# Patient Record
Sex: MALE | Race: WHITE | NOT HISPANIC OR LATINO | Employment: FULL TIME | URBAN - METROPOLITAN AREA
[De-identification: names, ages, dates, MRNs, and addresses within clinical notes are randomized per-mention and may not be internally consistent; named-entity substitution may affect disease eponyms.]

---

## 2017-11-29 ENCOUNTER — OFFICE VISIT (OUTPATIENT)
Dept: FAMILY MEDICINE | Facility: CLINIC | Age: 22
End: 2017-11-29
Payer: COMMERCIAL

## 2017-11-29 VITALS
HEIGHT: 73 IN | TEMPERATURE: 96 F | DIASTOLIC BLOOD PRESSURE: 70 MMHG | HEART RATE: 80 BPM | OXYGEN SATURATION: 98 % | BODY MASS INDEX: 32.54 KG/M2 | WEIGHT: 245.56 LBS | SYSTOLIC BLOOD PRESSURE: 124 MMHG

## 2017-11-29 DIAGNOSIS — B34.9 VIRAL SYNDROME: Primary | ICD-10-CM

## 2017-11-29 LAB
CTP QC/QA: YES
CTP QC/QA: YES
FLUAV AG NPH QL: NEGATIVE
FLUBV AG NPH QL: NEGATIVE
S PYO RRNA THROAT QL PROBE: NEGATIVE

## 2017-11-29 PROCEDURE — 99999 PR PBB SHADOW E&M-EST. PATIENT-LVL III: CPT | Mod: PBBFAC,,, | Performed by: PHYSICIAN ASSISTANT

## 2017-11-29 PROCEDURE — 87880 STREP A ASSAY W/OPTIC: CPT | Mod: QW,S$GLB,, | Performed by: PHYSICIAN ASSISTANT

## 2017-11-29 PROCEDURE — 99213 OFFICE O/P EST LOW 20 MIN: CPT | Mod: S$GLB,,, | Performed by: PHYSICIAN ASSISTANT

## 2017-11-29 PROCEDURE — 87804 INFLUENZA ASSAY W/OPTIC: CPT | Mod: QW,S$GLB,, | Performed by: PHYSICIAN ASSISTANT

## 2017-11-29 RX ORDER — DESLORATADINE 5 MG/1
5 TABLET ORAL DAILY
Qty: 30 TABLET | Refills: 11 | Status: SHIPPED | OUTPATIENT
Start: 2017-11-29 | End: 2019-03-08 | Stop reason: SDUPTHER

## 2017-11-29 NOTE — PROGRESS NOTES
Subjective:       Patient ID: Matthew Schmidt is a 22 y.o. male with multiple medical diagnoses as listed in the medical history and problem list that presents for Sore Throat  .    Chief Complaint: Sore Throat      Sore Throat    This is a new problem. The current episode started yesterday. The problem has been gradually worsening. The pain is worse on the left side. The pain is at a severity of 9/10. Associated symptoms include congestion, coughing (at night ), headaches and trouble swallowing. Pertinent negatives include no abdominal pain, diarrhea, ear pain, shortness of breath or vomiting. Treatments tried: advil last time was 6 am      Review of Systems   Constitutional: Positive for chills, fatigue and fever (subjective ).   HENT: Positive for congestion, rhinorrhea, sneezing, sore throat and trouble swallowing. Negative for ear pain, sinus pain and sinus pressure.    Eyes: Positive for discharge and itching. Negative for pain and redness.   Respiratory: Positive for cough (at night ) and chest tightness. Negative for shortness of breath and wheezing.    Gastrointestinal: Negative for abdominal pain, constipation, diarrhea, nausea and vomiting.   Musculoskeletal: Positive for myalgias.   Neurological: Positive for headaches.         PAST MEDICAL HISTORY:  History reviewed. No pertinent past medical history.    SOCIAL HISTORY:  Social History     Social History    Marital status: Single     Spouse name: N/A    Number of children: N/A    Years of education: N/A     Occupational History    Not on file.     Social History Main Topics    Smoking status: Never Smoker    Smokeless tobacco: Never Used    Alcohol use Yes    Drug use: No    Sexual activity: Yes     Partners: Female     Other Topics Concern    Not on file     Social History Narrative    [employer]    Video games: PS4, PC-[employer]       ALLERGIES AND MEDICATIONS: updated and reviewed.  Review of patient's allergies  "indicates:  No Known Allergies  Current Outpatient Prescriptions   Medication Sig Dispense Refill    desloratadine (CLARINEX) 5 mg tablet Take 1 tablet (5 mg total) by mouth once daily. 30 tablet 11     No current facility-administered medications for this visit.          Objective:   /70   Pulse 80   Temp 96.4 °F (35.8 °C) (Oral)   Ht 6' 1" (1.854 m)   Wt 111.4 kg (245 lb 9.5 oz)   SpO2 98%   BMI 32.40 kg/m²      Physical Exam   Constitutional: He is oriented to person, place, and time. No distress.   HENT:   Head: Normocephalic and atraumatic.   Right Ear: External ear and ear canal normal. Tympanic membrane is injected.   Left Ear: External ear and ear canal normal. Tympanic membrane is injected.   Nose: Mucosal edema and rhinorrhea present. Right sinus exhibits no maxillary sinus tenderness and no frontal sinus tenderness. Left sinus exhibits no maxillary sinus tenderness and no frontal sinus tenderness.   Mouth/Throat: Uvula is midline and mucous membranes are normal. No oropharyngeal exudate or posterior oropharyngeal erythema.   Air fluid levels   PND   Eyes: Conjunctivae and EOM are normal.   Cardiovascular: Normal rate and regular rhythm.    Pulmonary/Chest: Effort normal and breath sounds normal. He has no wheezes.   Negative egophony    Musculoskeletal: Normal range of motion.   Lymphadenopathy:     He has no cervical adenopathy.   Neurological: He is alert and oriented to person, place, and time.   Skin: Skin is warm. No erythema.           Assessment:       1. Viral syndrome        Plan:       Viral syndrome  -     POCT Rapid Strep A: negative  -     POCT Influenza A/B: negatve  -     desloratadine (CLARINEX) 5 mg tablet; Take 1 tablet (5 mg total) by mouth once daily.  Dispense: 30 tablet; Refill: 11  Ibuprofen 400 mg every 6 hours with food                 No Follow-up on file.  "

## 2017-11-29 NOTE — LETTER
November 29, 2017                 [facility]  Family Medicine  [address]  [address]  [place] [address]  Phone: [phone]  Fax: [phone]   November 29, 2017     Patient: Matthew Schmidt   YOB: 1995   Date of Visit: 11/29/2017       To Whom it May Concern:    Matthew Schmidt was seen in my clinic on 11/29/2017. He may return to work on 12/1/17.    If you have any questions or concerns, please don't hesitate to call.    Sincerely,         BRINDA Zepeda

## 2018-03-01 ENCOUNTER — OFFICE VISIT (OUTPATIENT)
Dept: FAMILY MEDICINE | Facility: CLINIC | Age: 23
End: 2018-03-01
Payer: COMMERCIAL

## 2018-03-01 VITALS
HEART RATE: 80 BPM | SYSTOLIC BLOOD PRESSURE: 110 MMHG | TEMPERATURE: 98 F | HEIGHT: 73 IN | DIASTOLIC BLOOD PRESSURE: 60 MMHG | BODY MASS INDEX: 31.03 KG/M2 | WEIGHT: 234.13 LBS | OXYGEN SATURATION: 97 %

## 2018-03-01 DIAGNOSIS — Z13.220 LIPID SCREENING: ICD-10-CM

## 2018-03-01 DIAGNOSIS — Z00.00 HEALTH CARE MAINTENANCE: ICD-10-CM

## 2018-03-01 DIAGNOSIS — J20.9 ACUTE BRONCHITIS, UNSPECIFIED ORGANISM: Primary | ICD-10-CM

## 2018-03-01 PROCEDURE — 99999 PR PBB SHADOW E&M-EST. PATIENT-LVL III: CPT | Mod: PBBFAC,,, | Performed by: INTERNAL MEDICINE

## 2018-03-01 PROCEDURE — 99213 OFFICE O/P EST LOW 20 MIN: CPT | Mod: S$GLB,,, | Performed by: INTERNAL MEDICINE

## 2018-03-01 RX ORDER — FLUTICASONE PROPIONATE 50 MCG
SPRAY, SUSPENSION (ML) NASAL
Refills: 0 | COMMUNITY
Start: 2018-01-10 | End: 2019-03-08 | Stop reason: SDUPTHER

## 2018-03-01 RX ORDER — AZITHROMYCIN 250 MG/1
TABLET, FILM COATED ORAL
Refills: 0 | COMMUNITY
Start: 2018-01-10 | End: 2018-05-09 | Stop reason: ALTCHOICE

## 2018-03-01 RX ORDER — ALBUTEROL SULFATE 90 UG/1
AEROSOL, METERED RESPIRATORY (INHALATION)
COMMUNITY
Start: 2018-02-28 | End: 2023-07-28

## 2018-03-01 RX ORDER — BENZONATATE 100 MG/1
CAPSULE ORAL
COMMUNITY
Start: 2018-02-28 | End: 2018-05-09 | Stop reason: ALTCHOICE

## 2018-03-01 RX ORDER — AMOXICILLIN AND CLAVULANATE POTASSIUM 875; 125 MG/1; MG/1
1 TABLET, FILM COATED ORAL 2 TIMES DAILY
Refills: 0 | COMMUNITY
Start: 2018-02-28 | End: 2018-05-09 | Stop reason: ALTCHOICE

## 2018-03-01 RX ORDER — ALBUTEROL SULFATE 0.83 MG/ML
SOLUTION RESPIRATORY (INHALATION)
Refills: 0 | COMMUNITY
Start: 2018-02-28 | End: 2022-04-01

## 2018-03-01 RX ORDER — PROMETHAZINE HYDROCHLORIDE AND DEXTROMETHORPHAN HYDROBROMIDE 6.25; 15 MG/5ML; MG/5ML
SYRUP ORAL
COMMUNITY
Start: 2018-02-28 | End: 2018-05-09 | Stop reason: ALTCHOICE

## 2018-03-01 RX ORDER — AZELASTINE 1 MG/ML
SPRAY, METERED NASAL
Refills: 0 | COMMUNITY
Start: 2018-01-10 | End: 2019-03-08

## 2018-03-01 RX ORDER — METHYLPREDNISOLONE 4 MG/1
TABLET ORAL
Refills: 0 | COMMUNITY
Start: 2018-02-28 | End: 2018-05-09 | Stop reason: ALTCHOICE

## 2018-03-01 NOTE — PROGRESS NOTES
SUBJECTIVE     Chief Complaint   Patient presents with    Follow-up     Bronchitis.       HPI  Matthew Schmidt is a 22 y.o. male with multiple medical diagnoses as listed in the medical history and problem list that presents for follow-up after recent urgent care for bronchitis. Pt reports symptoms of cough/wheezing started Monday and he went to Urgent Care yesterday and was diagnosed with acute bronchitis. Pt received a breathing treatment while there along with a steroid shot. He was discharged an albuterol inhaler, nebulizer, Augmentin  872-125 BID x 10 days, and a Medrol Dose Pack. He feels better since starting therapy. He had a CXR concerning for small calcified lymph nodes in the R hilum, so he presented here today for follow up.    PAST MEDICAL HISTORY:  History reviewed. No pertinent past medical history.    PAST SURGICAL HISTORY:  Past Surgical History:   Procedure Laterality Date    ADENOIDECTOMY      TONSILLECTOMY         SOCIAL HISTORY:  Social History     Social History    Marital status: Single     Spouse name: N/A    Number of children: N/A    Years of education: N/A     Occupational History    Not on file.     Social History Main Topics    Smoking status: Never Smoker    Smokeless tobacco: Never Used    Alcohol use Yes    Drug use: No    Sexual activity: Yes     Partners: Female     Other Topics Concern    Not on file     Social History Narrative    [employer]    Video games: PS4, [employer]       FAMILY HISTORY:  History reviewed. No pertinent family history.    ALLERGIES AND MEDICATIONS: updated and reviewed.  Review of patient's allergies indicates:  No Known Allergies  Current Outpatient Prescriptions   Medication Sig Dispense Refill    albuterol (PROVENTIL) 2.5 mg /3 mL (0.083 %) nebulizer solution nebulize ONE ampule FOUR TIMES DAILY AS NEEDED  0    amoxicillin-clavulanate 875-125mg (AUGMENTIN) 875-125 mg per tablet Take 1 tablet by mouth 2 (two)  "times daily.  0    azelastine (ASTELIN) 137 mcg (0.1 %) nasal spray ONE PUFF EACH NOSTRIL TWICE DAILY  0    azithromycin (Z-JAREN) 250 MG tablet TAKE 2 TABLETS TODAY THEN 1 TABLET DAILY UNTIL FINISHED  0    benzonatate (TESSALON) 100 MG capsule       desloratadine (CLARINEX) 5 mg tablet Take 1 tablet (5 mg total) by mouth once daily. 30 tablet 11    fluticasone (FLONASE) 50 mcg/actuation nasal spray ONE PUFF EACH NOSTRIL TWICE DAILY  0    methylPREDNISolone (MEDROL DOSEPACK) 4 mg tablet TAKE AS DIRECTED (start 03/01/2018)  0    PROAIR HFA 90 mcg/actuation inhaler       promethazine-dextromethorphan (PROMETHAZINE-DM) 6.25-15 mg/5 mL Syrp        No current facility-administered medications for this visit.        ROS  Review of Systems   Constitutional: Negative for chills and fever.   HENT: Negative for hearing loss and sore throat.    Eyes: Negative for visual disturbance.   Respiratory: Positive for cough and wheezing. Negative for shortness of breath.    Cardiovascular: Negative for chest pain, palpitations and leg swelling.   Gastrointestinal: Negative for abdominal pain, constipation, diarrhea, nausea and vomiting.   Genitourinary: Negative for dysuria, frequency and urgency.   Musculoskeletal: Negative for arthralgias, joint swelling and myalgias.   Skin: Negative for rash and wound.   Neurological: Negative for headaches.   Psychiatric/Behavioral: Negative for agitation and confusion. The patient is not nervous/anxious.          OBJECTIVE     Physical Exam  Vitals:    03/01/18 0855   BP: 110/60   Pulse: 80   Temp: 97.8 °F (36.6 °C)    Body mass index is 30.89 kg/m².  Weight: 106.2 kg (234 lb 2.1 oz)   Height: 6' 1" (185.4 cm)     Physical Exam   Constitutional: He is oriented to person, place, and time. He appears well-developed and well-nourished. No distress.   HENT:   Head: Normocephalic and atraumatic.   Right Ear: External ear normal.   Left Ear: External ear normal.   Nose: Nose normal. "   Mouth/Throat: Oropharynx is clear and moist.   Eyes: Conjunctivae and EOM are normal. Right eye exhibits no discharge. Left eye exhibits no discharge. No scleral icterus.   Neck: Normal range of motion. Neck supple. No JVD present. No tracheal deviation present.   Cardiovascular: Normal rate, regular rhythm, normal heart sounds and intact distal pulses.  Exam reveals no gallop and no friction rub.    No murmur heard.  Pulmonary/Chest: Effort normal and breath sounds normal. No respiratory distress. He has no wheezes.   Abdominal: Soft. Bowel sounds are normal. He exhibits no distension and no mass. There is no tenderness. There is no rebound and no guarding.   Musculoskeletal: Normal range of motion. He exhibits no edema, tenderness or deformity.   Neurological: He is alert and oriented to person, place, and time. He exhibits normal muscle tone. Coordination normal.   Skin: Skin is warm and dry. No rash noted. No erythema.   Psychiatric: He has a normal mood and affect. His behavior is normal. Judgment and thought content normal.         Health Maintenance       Date Due Completion Date    Lipid Panel 1995 ---    TETANUS VACCINE 05/08/2020 (Originally 9/21/2013) ---            ASSESSMENT     22 y.o. male with     1. Acute bronchitis, unspecified organism    2. Lipid screening    3. Health care maintenance        PLAN:     1. Acute bronchitis, unspecified organism  - Continue symptomatic treatment with rest, increase fluid intake, tylenol or ibuprofen PRN fever(temp >/= 100.4) or body aches. Okay to take OTC antihistamines, i.e. Bendaryl, Claritin, Allegra, etc. as needed.  - Okay to gargle with warm, salt water or use throat lozenges as needed  - Pt advised to continue meds as previously prescribed  - X-Ray Chest PA And Lateral; Future    2. Lipid screening  - Lipid panel; Future    3. Health care maintenance  - CBC auto differential; Future  - Comprehensive metabolic panel; Future  - TSH; Future        RTC  in 1-2 weeks as needed for any acute worsening of current condition or failure to improve     Lucinda Gauthier MD  03/01/2018 9:01 AM        No Follow-up on file.

## 2018-03-14 ENCOUNTER — LAB VISIT (OUTPATIENT)
Dept: LAB | Facility: HOSPITAL | Age: 23
End: 2018-03-14
Attending: FAMILY MEDICINE
Payer: COMMERCIAL

## 2018-03-14 DIAGNOSIS — Z13.220 LIPID SCREENING: ICD-10-CM

## 2018-03-14 DIAGNOSIS — D64.9 NORMOCYTIC ANEMIA: Primary | ICD-10-CM

## 2018-03-14 DIAGNOSIS — Z00.00 HEALTH CARE MAINTENANCE: ICD-10-CM

## 2018-03-14 LAB
ALBUMIN SERPL BCP-MCNC: 4.3 G/DL
ALP SERPL-CCNC: 63 U/L
ALT SERPL W/O P-5'-P-CCNC: 24 U/L
ANION GAP SERPL CALC-SCNC: 10 MMOL/L
AST SERPL-CCNC: 23 U/L
BASOPHILS # BLD AUTO: 0.03 K/UL
BASOPHILS NFR BLD: 0.6 %
BILIRUB SERPL-MCNC: 0.6 MG/DL
BUN SERPL-MCNC: 6 MG/DL
CALCIUM SERPL-MCNC: 9.7 MG/DL
CHLORIDE SERPL-SCNC: 108 MMOL/L
CHOLEST SERPL-MCNC: 141 MG/DL
CHOLEST/HDLC SERPL: 3.6 {RATIO}
CO2 SERPL-SCNC: 23 MMOL/L
CREAT SERPL-MCNC: 0.9 MG/DL
DIFFERENTIAL METHOD: ABNORMAL
EOSINOPHIL # BLD AUTO: 0.1 K/UL
EOSINOPHIL NFR BLD: 2.6 %
ERYTHROCYTE [DISTWIDTH] IN BLOOD BY AUTOMATED COUNT: 12.6 %
EST. GFR  (AFRICAN AMERICAN): >60 ML/MIN/1.73 M^2
EST. GFR  (NON AFRICAN AMERICAN): >60 ML/MIN/1.73 M^2
GLUCOSE SERPL-MCNC: 83 MG/DL
HCT VFR BLD AUTO: 39.9 %
HDLC SERPL-MCNC: 39 MG/DL
HDLC SERPL: 27.7 %
HGB BLD-MCNC: 13.8 G/DL
LDLC SERPL CALC-MCNC: 88.2 MG/DL
LYMPHOCYTES # BLD AUTO: 1.5 K/UL
LYMPHOCYTES NFR BLD: 27.9 %
MCH RBC QN AUTO: 30.5 PG
MCHC RBC AUTO-ENTMCNC: 34.6 G/DL
MCV RBC AUTO: 88 FL
MONOCYTES # BLD AUTO: 0.9 K/UL
MONOCYTES NFR BLD: 16.1 %
NEUTROPHILS # BLD AUTO: 2.8 K/UL
NEUTROPHILS NFR BLD: 52.6 %
NONHDLC SERPL-MCNC: 102 MG/DL
PLATELET # BLD AUTO: 240 K/UL
PMV BLD AUTO: 10.6 FL
POTASSIUM SERPL-SCNC: 3.6 MMOL/L
PROT SERPL-MCNC: 7.5 G/DL
RBC # BLD AUTO: 4.53 M/UL
SODIUM SERPL-SCNC: 141 MMOL/L
TRIGL SERPL-MCNC: 69 MG/DL
TSH SERPL DL<=0.005 MIU/L-ACNC: 0.8 UIU/ML
WBC # BLD AUTO: 5.34 K/UL

## 2018-03-14 PROCEDURE — 80053 COMPREHEN METABOLIC PANEL: CPT

## 2018-03-14 PROCEDURE — 36415 COLL VENOUS BLD VENIPUNCTURE: CPT | Mod: PO

## 2018-03-14 PROCEDURE — 84443 ASSAY THYROID STIM HORMONE: CPT

## 2018-03-14 PROCEDURE — 85025 COMPLETE CBC W/AUTO DIFF WBC: CPT

## 2018-03-14 PROCEDURE — 80061 LIPID PANEL: CPT

## 2018-05-09 ENCOUNTER — OFFICE VISIT (OUTPATIENT)
Dept: FAMILY MEDICINE | Facility: CLINIC | Age: 23
End: 2018-05-09
Payer: COMMERCIAL

## 2018-05-09 VITALS
TEMPERATURE: 98 F | WEIGHT: 227.06 LBS | BODY MASS INDEX: 30.09 KG/M2 | OXYGEN SATURATION: 99 % | HEIGHT: 73 IN | HEART RATE: 83 BPM | DIASTOLIC BLOOD PRESSURE: 70 MMHG | SYSTOLIC BLOOD PRESSURE: 124 MMHG

## 2018-05-09 DIAGNOSIS — D64.9 ANEMIA, UNSPECIFIED TYPE: ICD-10-CM

## 2018-05-09 DIAGNOSIS — H92.01 OTALGIA OF RIGHT EAR: Primary | ICD-10-CM

## 2018-05-09 PROCEDURE — 3008F BODY MASS INDEX DOCD: CPT | Mod: CPTII,S$GLB,, | Performed by: FAMILY MEDICINE

## 2018-05-09 PROCEDURE — 99999 PR PBB SHADOW E&M-EST. PATIENT-LVL III: CPT | Mod: PBBFAC,,, | Performed by: FAMILY MEDICINE

## 2018-05-09 PROCEDURE — 99213 OFFICE O/P EST LOW 20 MIN: CPT | Mod: S$GLB,,, | Performed by: FAMILY MEDICINE

## 2018-05-09 NOTE — PROGRESS NOTES
Chief Complaint   Patient presents with    Otalgia     right     Results       SUBJECTIVE:  aMtthew Schmidt is a 22 y.o. male here for new problem of right ear pain and after jazz fest, drop didn't help.  Currently has co-morbidities including per problem list.      History reviewed. No pertinent past medical history.  Past Surgical History:   Procedure Laterality Date    ADENOIDECTOMY      TONSILLECTOMY       Social History     Social History    Marital status: Single     Spouse name: N/A    Number of children: N/A    Years of education: N/A     Occupational History    Not on file.     Social History Main Topics    Smoking status: Never Smoker    Smokeless tobacco: Never Used    Alcohol use Yes    Drug use: No    Sexual activity: Yes     Partners: Female     Other Topics Concern    Not on file     Social History Narrative    [employer]    Video games: PS4, [employer]     History reviewed. No pertinent family history.  Current Outpatient Prescriptions on File Prior to Visit   Medication Sig Dispense Refill    albuterol (PROVENTIL) 2.5 mg /3 mL (0.083 %) nebulizer solution nebulize ONE ampule FOUR TIMES DAILY AS NEEDED  0    azelastine (ASTELIN) 137 mcg (0.1 %) nasal spray ONE PUFF EACH NOSTRIL TWICE DAILY  0    desloratadine (CLARINEX) 5 mg tablet Take 1 tablet (5 mg total) by mouth once daily. 30 tablet 11    fluticasone (FLONASE) 50 mcg/actuation nasal spray ONE PUFF EACH NOSTRIL TWICE DAILY  0    PROAIR HFA 90 mcg/actuation inhaler        No current facility-administered medications on file prior to visit.      Review of patient's allergies indicates:  No Known Allergies      Review of Systems   Constitutional: Negative.    HENT: Positive for ear pain. Negative for ear discharge.    Eyes: Negative.    Respiratory: Negative.    Cardiovascular: Negative.    Gastrointestinal: Negative.    Genitourinary: Negative.    Musculoskeletal: Negative.    Skin: Negative.   "  Neurological: Negative.    Endo/Heme/Allergies: Negative.    Psychiatric/Behavioral: Negative.        OBJECTIVE:  /70   Pulse 83   Temp 98.3 °F (36.8 °C) (Oral)   Ht 6' 1" (1.854 m)   Wt 103 kg (227 lb 1.2 oz)   SpO2 99%   BMI 29.96 kg/m²     Wt Readings from Last 3 Encounters:   05/09/18 103 kg (227 lb 1.2 oz)   03/01/18 106.2 kg (234 lb 2.1 oz)   11/29/17 111.4 kg (245 lb 9.5 oz)     BP Readings from Last 3 Encounters:   05/09/18 124/70   03/01/18 110/60   11/29/17 124/70       He appears well, in no apparent distress.  Alert and oriented times three, pleasant and cooperative. Vital signs are as documented in vital signs section.  Right ear with ruptured   Nose with mild allergic signs  Review of old Records:  Reviewed per [facility]    Review of old labs:      Review of old imaging:    ASSESSMENT:  Problem List Items Addressed This Visit     None      Visit Diagnoses     Otalgia of right ear    -  Primary    Anemia, unspecified type              ICD-10-CM ICD-9-CM   1. Otalgia of right ear H92.01 388.70   2. Anemia, unspecified type D64.9 285.9         PLAN:  1. Otalgia of right ear  Right ear drum ruptured but healing  Continue to monitor    2. Anemia, unspecified type  We will monitor and redo blood work up      Medication List with Changes/Refills   Current Medications    ALBUTEROL (PROVENTIL) 2.5 MG /3 ML (0.083 %) NEBULIZER SOLUTION    nebulize ONE ampule FOUR TIMES DAILY AS NEEDED    AZELASTINE (ASTELIN) 137 MCG (0.1 %) NASAL SPRAY    ONE PUFF EACH NOSTRIL TWICE DAILY    DESLORATADINE (CLARINEX) 5 MG TABLET    Take 1 tablet (5 mg total) by mouth once daily.    FLUTICASONE (FLONASE) 50 MCG/ACTUATION NASAL SPRAY    ONE PUFF EACH NOSTRIL TWICE DAILY    PROAIR HFA 90 MCG/ACTUATION INHALER       Discontinued Medications    AMOXICILLIN-CLAVULANATE 875-125MG (AUGMENTIN) 875-125 MG PER TABLET    Take 1 tablet by mouth 2 (two) times daily.    AZITHROMYCIN (Z-JAREN) 250 MG TABLET    TAKE 2 TABLETS TODAY THEN 1 " TABLET DAILY UNTIL FINISHED    BENZONATATE (TESSALON) 100 MG CAPSULE        METHYLPREDNISOLONE (MEDROL DOSEPACK) 4 MG TABLET    TAKE AS DIRECTED (start 03/01/2018)    PROMETHAZINE-DEXTROMETHORPHAN (PROMETHAZINE-DM) 6.25-15 MG/5 ML SYRP           Follow-up in about 2 weeks (around 5/23/2018), or if symptoms worsen or fail to improve, for reassess acute condition.

## 2018-08-13 ENCOUNTER — LAB VISIT (OUTPATIENT)
Dept: LAB | Facility: HOSPITAL | Age: 23
End: 2018-08-13
Attending: INTERNAL MEDICINE
Payer: COMMERCIAL

## 2018-08-13 ENCOUNTER — PATIENT MESSAGE (OUTPATIENT)
Dept: FAMILY MEDICINE | Facility: CLINIC | Age: 23
End: 2018-08-13

## 2018-08-13 DIAGNOSIS — D64.9 NORMOCYTIC ANEMIA: ICD-10-CM

## 2018-08-13 LAB
FERRITIN SERPL-MCNC: 75 NG/ML
IRON SERPL-MCNC: 195 UG/DL
RETICS/RBC NFR AUTO: 0.9 %
SATURATED IRON: 43 %
TOTAL IRON BINDING CAPACITY: 454 UG/DL
TRANSFERRIN SERPL-MCNC: 307 MG/DL

## 2018-08-13 PROCEDURE — 83540 ASSAY OF IRON: CPT

## 2018-08-13 PROCEDURE — 82607 VITAMIN B-12: CPT

## 2018-08-13 PROCEDURE — 84425 ASSAY OF VITAMIN B-1: CPT

## 2018-08-13 PROCEDURE — 82746 ASSAY OF FOLIC ACID SERUM: CPT

## 2018-08-13 PROCEDURE — 36415 COLL VENOUS BLD VENIPUNCTURE: CPT | Mod: PO

## 2018-08-13 PROCEDURE — 85045 AUTOMATED RETICULOCYTE COUNT: CPT

## 2018-08-13 PROCEDURE — 82728 ASSAY OF FERRITIN: CPT

## 2018-08-13 PROCEDURE — 83021 HEMOGLOBIN CHROMOTOGRAPHY: CPT

## 2018-08-14 LAB
FOLATE SERPL-MCNC: 11.6 NG/ML
VIT B12 SERPL-MCNC: 356 PG/ML

## 2018-08-15 ENCOUNTER — OFFICE VISIT (OUTPATIENT)
Dept: FAMILY MEDICINE | Facility: CLINIC | Age: 23
End: 2018-08-15
Payer: COMMERCIAL

## 2018-08-15 ENCOUNTER — HOSPITAL ENCOUNTER (EMERGENCY)
Facility: HOSPITAL | Age: 23
Discharge: HOME | End: 2018-08-15
Attending: EMERGENCY MEDICINE
Payer: COMMERCIAL

## 2018-08-15 ENCOUNTER — TELEPHONE (OUTPATIENT)
Dept: FAMILY MEDICINE | Facility: CLINIC | Age: 23
End: 2018-08-15

## 2018-08-15 VITALS
TEMPERATURE: 98 F | HEART RATE: 65 BPM | RESPIRATION RATE: 19 BRPM | OXYGEN SATURATION: 99 % | DIASTOLIC BLOOD PRESSURE: 70 MMHG | WEIGHT: 210 LBS | SYSTOLIC BLOOD PRESSURE: 121 MMHG | HEIGHT: 74 IN | BODY MASS INDEX: 26.95 KG/M2

## 2018-08-15 VITALS
SYSTOLIC BLOOD PRESSURE: 130 MMHG | WEIGHT: 212.94 LBS | HEIGHT: 74 IN | HEART RATE: 92 BPM | RESPIRATION RATE: 16 BRPM | DIASTOLIC BLOOD PRESSURE: 80 MMHG | OXYGEN SATURATION: 98 % | TEMPERATURE: 97 F | BODY MASS INDEX: 27.33 KG/M2

## 2018-08-15 DIAGNOSIS — Z20.2 STD EXPOSURE: Primary | ICD-10-CM

## 2018-08-15 DIAGNOSIS — R31.9 HEMATURIA, UNSPECIFIED TYPE: ICD-10-CM

## 2018-08-15 DIAGNOSIS — N20.0 NEPHROLITHIASIS: ICD-10-CM

## 2018-08-15 DIAGNOSIS — N20.0 LEFT NEPHROLITHIASIS: Primary | ICD-10-CM

## 2018-08-15 DIAGNOSIS — N23 RENAL COLIC ON LEFT SIDE: ICD-10-CM

## 2018-08-15 LAB
ALBUMIN SERPL BCP-MCNC: 5 G/DL
ALP SERPL-CCNC: 71 U/L
ALT SERPL W/O P-5'-P-CCNC: 15 U/L
ANION GAP SERPL CALC-SCNC: 11 MMOL/L
AST SERPL-CCNC: 18 U/L
BACTERIA #/AREA URNS HPF: ABNORMAL /HPF
BASOPHILS # BLD AUTO: 0.03 K/UL
BASOPHILS NFR BLD: 0.3 %
BILIRUB SERPL-MCNC: 1 MG/DL
BILIRUB UR QL STRIP: NEGATIVE
BUN SERPL-MCNC: 17 MG/DL
CALCIUM SERPL-MCNC: 10.4 MG/DL
CHLORIDE SERPL-SCNC: 103 MMOL/L
CK SERPL-CCNC: 149 U/L
CLARITY UR: CLEAR
CO2 SERPL-SCNC: 25 MMOL/L
COLOR UR: YELLOW
CREAT SERPL-MCNC: 1.3 MG/DL
DIFFERENTIAL METHOD: ABNORMAL
EOSINOPHIL # BLD AUTO: 0.1 K/UL
EOSINOPHIL NFR BLD: 1.1 %
ERYTHROCYTE [DISTWIDTH] IN BLOOD BY AUTOMATED COUNT: 12.3 %
EST. GFR  (AFRICAN AMERICAN): >60 ML/MIN/1.73 M^2
EST. GFR  (NON AFRICAN AMERICAN): >60 ML/MIN/1.73 M^2
GLUCOSE SERPL-MCNC: 84 MG/DL
GLUCOSE UR QL STRIP: NEGATIVE
HCT VFR BLD AUTO: 41.8 %
HGB BLD-MCNC: 15.1 G/DL
HGB UR QL STRIP: ABNORMAL
HYALINE CASTS #/AREA URNS LPF: 0 /LPF
KETONES UR QL STRIP: NEGATIVE
LEUKOCYTE ESTERASE UR QL STRIP: NEGATIVE
LYMPHOCYTES # BLD AUTO: 1.6 K/UL
LYMPHOCYTES NFR BLD: 17.4 %
MCH RBC QN AUTO: 30.7 PG
MCHC RBC AUTO-ENTMCNC: 36.1 G/DL
MCV RBC AUTO: 85 FL
MICROSCOPIC COMMENT: ABNORMAL
MONOCYTES # BLD AUTO: 1.2 K/UL
MONOCYTES NFR BLD: 13.7 %
NEUTROPHILS # BLD AUTO: 6 K/UL
NEUTROPHILS NFR BLD: 67.3 %
NITRITE UR QL STRIP: NEGATIVE
PH UR STRIP: 5 [PH] (ref 5–8)
PLATELET # BLD AUTO: 265 K/UL
PMV BLD AUTO: 9.7 FL
POTASSIUM SERPL-SCNC: 3.6 MMOL/L
PROT SERPL-MCNC: 9 G/DL
PROT UR QL STRIP: ABNORMAL
RBC # BLD AUTO: 4.92 M/UL
RBC #/AREA URNS HPF: >100 /HPF (ref 0–4)
SODIUM SERPL-SCNC: 139 MMOL/L
SP GR UR STRIP: >1.03 (ref 1–1.03)
SQUAMOUS #/AREA URNS HPF: 1 /HPF
URN SPEC COLLECT METH UR: ABNORMAL
UROBILINOGEN UR STRIP-ACNC: ABNORMAL EU/DL
WBC # BLD AUTO: 8.92 K/UL
WBC #/AREA URNS HPF: 1 /HPF (ref 0–5)

## 2018-08-15 PROCEDURE — 96375 TX/PRO/DX INJ NEW DRUG ADDON: CPT

## 2018-08-15 PROCEDURE — 87491 CHLMYD TRACH DNA AMP PROBE: CPT

## 2018-08-15 PROCEDURE — 63600175 PHARM REV CODE 636 W HCPCS: Performed by: PHYSICIAN ASSISTANT

## 2018-08-15 PROCEDURE — 85025 COMPLETE CBC W/AUTO DIFF WBC: CPT

## 2018-08-15 PROCEDURE — 99284 EMERGENCY DEPT VISIT MOD MDM: CPT | Mod: 25

## 2018-08-15 PROCEDURE — 87086 URINE CULTURE/COLONY COUNT: CPT

## 2018-08-15 PROCEDURE — 81001 URINALYSIS AUTO W/SCOPE: CPT | Mod: S$GLB,,, | Performed by: PHYSICIAN ASSISTANT

## 2018-08-15 PROCEDURE — 96361 HYDRATE IV INFUSION ADD-ON: CPT

## 2018-08-15 PROCEDURE — 99999 PR PBB SHADOW E&M-EST. PATIENT-LVL V: CPT | Mod: PBBFAC,,, | Performed by: PHYSICIAN ASSISTANT

## 2018-08-15 PROCEDURE — 3008F BODY MASS INDEX DOCD: CPT | Mod: CPTII,S$GLB,, | Performed by: PHYSICIAN ASSISTANT

## 2018-08-15 PROCEDURE — 82550 ASSAY OF CK (CPK): CPT

## 2018-08-15 PROCEDURE — 81000 URINALYSIS NONAUTO W/SCOPE: CPT

## 2018-08-15 PROCEDURE — 99214 OFFICE O/P EST MOD 30 MIN: CPT | Mod: 25,S$GLB,, | Performed by: PHYSICIAN ASSISTANT

## 2018-08-15 PROCEDURE — 80053 COMPREHEN METABOLIC PANEL: CPT

## 2018-08-15 PROCEDURE — 25000003 PHARM REV CODE 250: Performed by: NURSE PRACTITIONER

## 2018-08-15 PROCEDURE — 96374 THER/PROPH/DIAG INJ IV PUSH: CPT

## 2018-08-15 RX ORDER — TAMSULOSIN HYDROCHLORIDE 0.4 MG/1
0.4 CAPSULE ORAL DAILY
Qty: 30 CAPSULE | Refills: 0 | Status: SHIPPED | OUTPATIENT
Start: 2018-08-15 | End: 2019-03-08

## 2018-08-15 RX ORDER — KETOROLAC TROMETHAMINE 30 MG/ML
15 INJECTION, SOLUTION INTRAMUSCULAR; INTRAVENOUS
Status: COMPLETED | OUTPATIENT
Start: 2018-08-15 | End: 2018-08-15

## 2018-08-15 RX ORDER — KETOROLAC TROMETHAMINE 10 MG/1
10 TABLET, FILM COATED ORAL EVERY 6 HOURS PRN
Qty: 15 TABLET | Refills: 0 | Status: SHIPPED | OUTPATIENT
Start: 2018-08-15 | End: 2019-03-08

## 2018-08-15 RX ORDER — MORPHINE SULFATE 4 MG/ML
4 INJECTION, SOLUTION INTRAMUSCULAR; INTRAVENOUS
Status: COMPLETED | OUTPATIENT
Start: 2018-08-15 | End: 2018-08-15

## 2018-08-15 RX ADMIN — MORPHINE SULFATE 4 MG: 4 INJECTION INTRAVENOUS at 07:08

## 2018-08-15 RX ADMIN — SODIUM CHLORIDE 1000 ML: 0.9 INJECTION, SOLUTION INTRAVENOUS at 06:08

## 2018-08-15 RX ADMIN — KETOROLAC TROMETHAMINE 15 MG: 30 INJECTION, SOLUTION INTRAMUSCULAR at 06:08

## 2018-08-15 NOTE — LETTER
August 15, 2018                 [facility]  Family Medicine  [address]  [address]  [place] [address]  Phone: [phone]  Fax: [phone]   August 15, 2018     Patient: Matthew Schmidt   YOB: 1995   Date of Visit: 8/15/2018       To Whom it May Concern:    Matthew Schmidt was seen in my clinic on 8/15/2018. He may return to work on 8/18/18.    If you have any questions or concerns, please don't hesitate to call.    Sincerely,         BRINDA Zepeda

## 2018-08-15 NOTE — TELEPHONE ENCOUNTER
----- Message from Bettina Pagan sent at 8/15/2018  1:08 PM CDT -----  Contact: Mother- Emma  Patient's mother says patient may have contracted Herpes and is coming in for an appointment to be tested. She says the patient does not know the severity of the Disease and would like the doctor to make sure he explain it to him thoroughly. Please call her at [phone]

## 2018-08-15 NOTE — PROGRESS NOTES
Subjective:       Patient ID: Matthew Schmidt is a 22 y.o. male with multiple medical diagnoses as listed in the medical history and problem list that presents for Exposure to STD  .    Chief Complaint: Exposure to STD      HPI   Pt here today for STD screening. He has had 2 incident of unprotected sex but he states he stops once they start because he second guesses himself. His gf failed to mention she had herpes. He recently found out. He is freaking out. He denies any symptoms.   He is freaking out now because he got a urine sample and its very dark. 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 He is drinking a lot and sweating a lot. No electrolytes today. He states he didn't really urinate much today. Dad with hx of stones.     Review of Systems   Constitutional: Negative for chills and fever.   Gastrointestinal: Positive for vomiting (every morning for a week and he did once today; fever chills too; went to UC and treated with abx. ).   Genitourinary: Positive for decreased urine volume (today but he does work in a warehouse did not drink electrolytes ), flank pain and hematuria (smoked cigaerettes in the past couple days alot due to stress ). Negative for difficulty urinating, discharge, dysuria, frequency, penile pain, penile swelling, scrotal swelling, testicular pain and urgency.         PAST MEDICAL HISTORY:  No past medical history on file.    SOCIAL HISTORY:  Social History     Socioeconomic History    Marital status: Single     Spouse name: Not on file    Number of children: Not on file    Years of education: Not on file    Highest education level: Not on file   Social Needs    Financial resource strain: Not on file    Food insecurity - worry: Not on file    Food insecurity - inability: Not on file    Transportation needs - medical: Not on file    Transportation needs - non-medical: Not on file   Occupational History    Not on file   Tobacco Use    Smoking status: Current Every Day Smoker  "    Packs/day: 0.25     Types: Cigarettes    Smokeless tobacco: Never Used   Substance and Sexual Activity    Alcohol use: Yes    Drug use: No    Sexual activity: Yes     Partners: Female   Other Topics Concern    Not on file   Social History Narrative    [employer]    Video games: [employer], [employer] it!       ALLERGIES AND MEDICATIONS: updated and reviewed.  Review of patient's allergies indicates:  No Known Allergies  Current Outpatient Medications   Medication Sig Dispense Refill    albuterol (PROVENTIL) 2.5 mg /3 mL (0.083 %) nebulizer solution nebulize ONE ampule FOUR TIMES DAILY AS NEEDED  0    azelastine (ASTELIN) 137 mcg (0.1 %) nasal spray ONE PUFF EACH NOSTRIL TWICE DAILY  0    desloratadine (CLARINEX) 5 mg tablet Take 1 tablet (5 mg total) by mouth once daily. 30 tablet 11    fluticasone (FLONASE) 50 mcg/actuation nasal spray ONE PUFF EACH NOSTRIL TWICE DAILY  0    PROAIR HFA 90 mcg/actuation inhaler       tamsulosin (FLOMAX) 0.4 mg Cap Take 1 capsule (0.4 mg total) by mouth once daily. 30 capsule 0     No current facility-administered medications for this visit.          Objective:   /80   Pulse 92   Temp 96.7 °F (35.9 °C) (Oral)   Resp 16   Ht 6' 2" (1.88 m)   Wt 96.6 kg (212 lb 15.4 oz)   SpO2 98%   BMI 27.34 kg/m²      Physical Exam   Constitutional: He is oriented to person, place, and time. No distress.   HENT:   Head: Normocephalic and atraumatic.   Cardiovascular: Normal rate and regular rhythm.   Pulmonary/Chest: Effort normal and breath sounds normal.   Abdominal: Soft. Normal appearance. Bowel sounds are increased. There is no CVA tenderness.   Genitourinary: Testes normal and penis normal. Circumcised. No penile erythema or penile tenderness. No discharge found.   Neurological: He is alert and oriented to person, place, and time.           Assessment:       1. STD exposure    2. Hematuria, unspecified type    3. Nephrolithiasis        Plan:       STD " exposure  -     HIV-1 and HIV-2 antibodies; Future; Expected date: 08/15/2018  -     Herpes simplex Virus (HSV) Type 1 & 2 DNA by PCR; Future; Expected date: 08/15/2018  -     RPR; Future; Expected date: 08/15/2018  -     C. trachomatis/N. gonorrhoeae by AMP DNA  -     Hepatitis panel, acute; Future; Expected date: 08/15/2018    Hematuria, unspecified type  -     POCT urinalysis, dipstick or tablet reag: +leuko, pos protein, small bili, 250 blood   -     Urinalysis; Future; Expected date: 08/15/2018  -     Urine culture; Future; Expected date: 08/15/2018  -     FL Upper GI With KUB; Future; Expected date: 08/15/2018  -     Comprehensive metabolic panel; Future; Expected date: 08/15/2018    Nephrolithiasis  -     tamsulosin (FLOMAX) 0.4 mg Cap; Take 1 capsule (0.4 mg total) by mouth once daily.  Dispense: 30 capsule; Refill: 0  Strainer given   Will treat if infected.   Wait for culture.     -will contact with results     Advised to always have protected sex to protect himself.   Pt voiced understanding  I went over each STD in detail and treatment and long term prognosis.             No Follow-up on file.

## 2018-08-15 NOTE — PATIENT INSTRUCTIONS
Blood in the Urine    Blood in the urine (hematuria) has many possible causes. If it occurs after an injury (such as a car accident or fall), it is most often a sign of bruising to the kidney or bladder. Common causes of blood in the urine include urinary tract infections, kidney stones, inflammation, tumors, or certain other diseases of the kidney or bladder. Menstruation can cause blood to appear in the urine sample, although it is not coming from the urinary tract.  If only a trace amount of blood is present, it will show up on the urine test, even though the urine may be yellow and not pink or red. This may occur with any of the above conditions, as well as heavy exercise or high fever. In this case, your doctor may want to repeat the urine test on another day. This will show if the blood is still present. If it is, then other tests can be done to find out the cause.  Home care  Follow these home care guidelines:  · If your urine does not appear bloody (pink, brown or red) then you do not need to restrict your activity in any way.  · If you can see blood in your urine, rest and avoid heavy exertion until your next exam. Do not use aspirin, blood thinners, or anti-platelet or anti-inflammatory medicines. These include ibuprofen and naproxen. These thin the blood and may increase bleeding.  Follow-up care  Follow up with your healthcare provider, or as advised. If you were injured and had blood in your urine, you should have a repeat urine test in 1 to 2 days. Contact your doctor for this test.  A radiologist will review any X-rays that were taken. You will be told of any new findings that may affect your care.  When to seek medical advice  Call your healthcare provider right away if any of these occur:  · Bright red blood or blood clots in the urine (if you did not have this before)  · Weakness, dizziness or fainting  · New groin, abdominal, or back pain  · Fever of 100.4ºF (38ºC) or higher, or as directed by  your healthcare provider  · Repeated vomiting  · Bleeding from the nose or gums or easy bruising  Date Last Reviewed: 9/1/2016 © 2000-2017 [employer] [address] [place] [address], [place] [address]. All rights reserved. This information is not intended as a substitute for professional medical care. Always follow your healthcare professional's instructions.        Hematuria: Possible Causes     Many things can lead to blood in the urine (hematuria). The blood may be seen with the eye (macroscopic or gross hematuria). Or it may only be seen when the urine is looked at under a microscope (microscopic hematuria). Some of the most common causes of blood in the urine are listed below. Often, no cause for the blood can be found. This is called idiopathic hematuria.  · Kidney or bladder stones are collections of crystals. They form in the urine. Stones may be found anywhere in the urinary tract. But they form most often in the kidneys or bladder. In addition to blood in the urine, they can cause severe pain.  · BPH stands for benign prostatic hyperplasia. It is enlargement of the prostate gland. It happens as men age. BPH often causes problems with urination. It sometimes causes blood in the urine.  · A urinary tract infection (UTI) is due to bacteria growing in the urinary tract. It can cause blood in the urine. Other symptoms include burning or pain with urination. You may need to urinate often or urgently. You may also have a fever.  · Damage to the urinary tract may cause blood in the urine. This damage may be due to a blow or accident. It may also result from the use of a urinary catheter. Very hard exercise may sometimes irritate the urinary tract and cause bleeding.  · Cancer may occur anywhere in the urinary tract. A tumor may sometimes cause no symptoms other than bleeding.     Other possible causes of bleeding include:  · Prostatitis (infection of the prostate gland)  · Taking anticoagulants  · Blockage  in the urinary tract  · Disease or inflammation of the kidney  · Cystic diseases of the kidneys  · Sickle cell anemia  · Vigorous exercise  · Endometriosis  Date Last Reviewed: 12/1/2016 © 2000-2017 [employer] [address] [place] [address], [place] [address]. All rights reserved. This information is not intended as a substitute for professional medical care. Always follow your healthcare professional's instructions.        Treating Kidney Stones: Expectant Therapy  Most kidney stones are about the size of a grape seed. Stones of this size are small enough to pass naturally. Once it is passed, a stone can be analyzed. This wait-and-see approach is called expectant therapy. Small stones can often be passed with expectant therapy. If pain is a problem, ask your healthcare provider about pain medicines. Then follow his or her directions on how much water to drink. Drinking more water creates more urine to flush out your stone. Also be sure to strain your urine. Take any stones you pass to your provider for analysis.    Drink lots of water  Drinking lots of water may help your stone pass. Water also dilutes the chemicals in your urine. This reduces your risk of forming new stones. You may be told to drink 8, 12-ounce glasses of water a day. Avoid liquids that dehydrate you, such as those containing caffeine or alcohol.  Strain your urine  Straining your urine lets you collect your stone for analysis. Use the strainer each time you urinate. Strain your urine for as long as your healthcare provider suggests. Watch for brown, tan, gold, or black specks or tiny amish. These may be kidney stones.  Take your medicine  Your healthcare provider may give you medicine that makes it more likely for you to pass the kidney stone.   Follow up with your healthcare provider  Follow up by taking any stones you find to your provider for analysis. The type of stone you have determines your diet and prevention program. You may  need more tests in the future. These tests will ensure that new stones are not forming.  Date Last Reviewed: 1/1/2017  © 0955-5011 [employer]. [address] [place] [address], [place] [address]. All rights reserved. This information is not intended as a substitute for professional medical care. Always follow your healthcare professional's instructions.

## 2018-08-15 NOTE — ED PROVIDER NOTES
Encounter Date: 8/15/2018    SCRIBE #1 NOTE: I, Esa Rangel, am scribing for, and in the presence of,  Olivia PARRY. I have scribed the following portions of the note - Other sections scribed: HPI, ROS.       History     Chief Complaint   Patient presents with    Flank Pain     States he has lower left back pain that radiates into his flank and also saw a little blood in his urine.  Thinks its kidney stones     CC: Flank Pain    HPI: 23 y/o male with no PMHx presents to the ED complaining of left sided flank pain that started this morning. The pain is severe 6/10 and worse with palpation. The pt works in a warehouse and continually drinks water, however he never felt like he needed to urinate. Earlier today he visited his PCP to get tested for STDs but wasn't treated prophylactically. At the facility he urinated in a cup for urinalysis and noticed dark colored urine. The pt was discharged with flomax, and took one PTA.  His family has history of kidney stones. The pt is sexually active with females but does not receive anal sex. The pt denies headaches, rhinorrhea, penile discharge, nausea, emesis, or blood in stool. No other symptoms reported      The history is provided by the patient and a parent. No  was used.     Review of patient's allergies indicates:  No Known Allergies  History reviewed. No pertinent past medical history.  Past Surgical History:   Procedure Laterality Date    ADENOIDECTOMY      TONSILLECTOMY       History reviewed. No pertinent family history.  Social History     Tobacco Use    Smoking status: Current Every Day Smoker     Packs/day: 0.25     Types: Cigarettes    Smokeless tobacco: Never Used   Substance Use Topics    Alcohol use: Yes    Drug use: No     Review of Systems   Constitutional: Negative for chills and fever.   HENT: Negative for congestion, ear pain, rhinorrhea and sore throat.    Eyes: Negative for redness.   Respiratory: Negative for cough and  shortness of breath.    Cardiovascular: Negative for chest pain and leg swelling.   Gastrointestinal: Negative for abdominal pain, constipation, diarrhea, nausea and vomiting.   Genitourinary: Positive for flank pain (left sided). Negative for discharge and dysuria.        (+) dark colored urine    Musculoskeletal: Negative for back pain and neck pain.   Skin: Negative for rash.   Neurological: Negative for headaches.   Psychiatric/Behavioral: Negative for confusion.       Physical Exam     Initial Vitals [08/15/18 1800]   BP Pulse Resp Temp SpO2   (!) 142/63 85 16 98 °F (36.7 °C) 98 %      MAP       --         Physical Exam    Nursing note and vitals reviewed.  Constitutional: Vital signs are normal. He appears well-developed and well-nourished. He is not diaphoretic. He is cooperative.  Non-toxic appearance. He does not have a sickly appearance. He does not appear ill. No distress.   HENT:   Head: Normocephalic and atraumatic.   Right Ear: Tympanic membrane, external ear and ear canal normal.   Left Ear: Tympanic membrane, external ear and ear canal normal.   Nose: Nose normal.   Mouth/Throat: Uvula is midline, oropharynx is clear and moist and mucous membranes are normal.   Eyes: Conjunctivae, EOM and lids are normal. Pupils are equal, round, and reactive to light.   Neck: Trachea normal, normal range of motion, full passive range of motion without pain and phonation normal. Neck supple.   Cardiovascular: Normal rate, regular rhythm, normal heart sounds and intact distal pulses. Exam reveals no gallop and no friction rub.    No murmur heard.  Pulmonary/Chest: Effort normal and breath sounds normal. No respiratory distress. He has no decreased breath sounds. He has no wheezes. He has no rhonchi. He has no rales.   Abdominal: Soft. Normal appearance and bowel sounds are normal. He exhibits no distension. There is no tenderness. There is CVA tenderness (left). There is no rigidity, no rebound and no guarding.    Musculoskeletal: Normal range of motion.   Neurological: He is alert and oriented to person, place, and time. He has normal strength.   Skin: Skin is warm and dry. Capillary refill takes less than 2 seconds. No rash noted.   Psychiatric: He has a normal mood and affect. His speech is normal and behavior is normal. Judgment and thought content normal. Cognition and memory are normal.         ED Course   Procedures  Labs Reviewed   CBC W/ AUTO DIFFERENTIAL - Abnormal; Notable for the following components:       Result Value    MCHC 36.1 (*)     Mono # 1.2 (*)     Lymph% 17.4 (*)     All other components within normal limits   COMPREHENSIVE METABOLIC PANEL - Abnormal; Notable for the following components:    Total Protein 9.0 (*)     All other components within normal limits   URINALYSIS, REFLEX TO URINE CULTURE - Abnormal; Notable for the following components:    Specific Gravity, UA >1.030 (*)     Protein, UA 1+ (*)     Occult Blood UA 3+ (*)     Urobilinogen, UA 4.0-6.0 (*)     All other components within normal limits    Narrative:     Preferred Collection Type->Urine, Clean Catch   URINALYSIS MICROSCOPIC - Abnormal; Notable for the following components:    RBC, UA >100 (*)     All other components within normal limits    Narrative:     Preferred Collection Type->Urine, Clean Catch   CK          Imaging Results          CT Renal Stone Study ABD Pelvis WO (Final result)  Result time 08/15/18 19:17:02    Final result by Mary Ellen Lopez MD (08/15/18 19:17:02)                 Impression:      Punctate nonobstructing left upper pole nephrolith.  Otherwise this exam is within normal limits.      Electronically signed by: Mary Ellen Lopez MD  Date:    08/15/2018  Time:    19:17             Narrative:    EXAMINATION:  CT RENAL STONE STUDY ABD PELVIS WO    CLINICAL HISTORY:  Flank pain, stone disease suspected;    TECHNIQUE:  Low dose axial images, sagittal and coronal reformations were obtained from the lung bases to  the pubic symphysis.  Contrast was not administered.    COMPARISON:  None    FINDINGS:  Lower chest: The visualized lung bases, heart and pericardium show no abnormalities.    Genitourinary system: There is a punctate nephrolith in the upper pole of the left kidney which does not involve the collecting system there is also a subcentimeter hypodense focus in the interpolar region of the right kidney which is too small to characterize however may represent a simple cyst.  Otherwise, the kidneys, ureters and bladder are normal.    Gastrointestinal tract: The large and small intestines and the appendix have a normal, noncontrast appearance.  The stomach is normal as well.    The liver, gallbladder, spleen, adrenal glands, pancreas, prostate and rectum are normal.  The abdominal aorta tapers normally.    There is no abdominal or pelvic free fluid, free air or lymphadenopathy.    Miscellaneous: There is a 1 cm cystic structure just lateral to the junction of the descending colon and sigmoid colons.  This is likely of no clinical significance.    Osseous and soft tissue structures: There is no suspicious lytic or blastic lesions.  The soft tissues are normal.                                       APC / Resident Notes:   This is an evaluation of a 22 y.o. male that presents to the Emergency Department for flank pain. Patient reports left sided flank pain and hematuria that began today. Patient reports seeing PCP this afternoon for symptoms and STD check; patient was tested for common STDs, prescribed Flomax and told to come to the ED for further evaluation of symptoms. Patient took one dose of Flomax prior to arrival.     Physical Exam shows a non-toxic, afebrile, and well appearing male. There is left CVA tenderness. Abdomen is soft and non-tender. Bowel sounds appreciated. No peritoneal signs. Remainder of exam unremarkable.    Vital Signs Are Reassuring. If available, previous records reviewed.   RESULTS:   UA revealing  for hematuria  CBC unrevealing for leukocytosis or anemia.  CMP unrevealing for electrolyte abnormality, acute kidney injury, transaminitis.  .  CT renal stone study revealing for punctate non-obstructing left upper pole nephrolith.    My overall impression is left nephrolithiasis, renal colic, hematuria.  DDx: flank pain, nephrolithiasis, renal colic, hematuria, UTI, other    ED Course: IV fluid rehydration 1 L, Toradol 15 mg IV. Upon reevaluation, patient reports continued pain. Morphine 4 mg IV ordered for pain control. I feel this patient is stable for discharge. D/C Meds: Toradol. Additional D/C Information: I will recommend that patient take Flomax as directed by PCP. I will recommend increased fluid intake. The diagnosis, treatment plan, instructions for follow-up and reevaluation with urology, as well as ED return precautions were discussed and understanding was verbalized. All questions or concerns have been addressed. Patient was discharged home with an instructional sheet which gave not only information regarding the most likely diagnoses but also information regarding when to return to the emergency department for alarming symptoms and when to seek further care.      This case was discussed with Dr. Yeh who is in agreement with my assessment and plan.     Olivia Moreau PA-C         Scribe Attestation:   Scribe #1: I performed the above scribed service and the documentation accurately describes the services I performed. I attest to the accuracy of the note.    Attending Attestation:           Physician Attestation for Scribe:  Physician Attestation Statement for Scribe #1: I, Olivia Kaminski, reviewed documentation, as scribed by Esa Rangel in my presence, and it is both accurate and complete.                    Clinical Impression:   The primary encounter diagnosis was Left nephrolithiasis. Diagnoses of Hematuria, unspecified type and Renal colic on left side were also pertinent to this  visit.      Disposition:   Disposition: Discharged  Condition: Stable                        Olivia Wetzel PA-C  08/15/18 5914

## 2018-08-15 NOTE — ED TRIAGE NOTES
Patient reports left sided back pain,patient reports urinating only once today.  Patient also reports urinary hesitancy. Denies abdominal pain.

## 2018-08-16 ENCOUNTER — LAB VISIT (OUTPATIENT)
Dept: LAB | Facility: HOSPITAL | Age: 23
End: 2018-08-16
Attending: PHYSICIAN ASSISTANT
Payer: COMMERCIAL

## 2018-08-16 ENCOUNTER — PATIENT MESSAGE (OUTPATIENT)
Dept: FAMILY MEDICINE | Facility: CLINIC | Age: 23
End: 2018-08-16

## 2018-08-16 DIAGNOSIS — Z20.2 STD EXPOSURE: ICD-10-CM

## 2018-08-16 DIAGNOSIS — R31.9 HEMATURIA, UNSPECIFIED TYPE: ICD-10-CM

## 2018-08-16 LAB
ALBUMIN SERPL BCP-MCNC: 4.2 G/DL
ALP SERPL-CCNC: 61 U/L
ALT SERPL W/O P-5'-P-CCNC: 12 U/L
ANION GAP SERPL CALC-SCNC: 9 MMOL/L
AST SERPL-CCNC: 14 U/L
BILIRUB SERPL-MCNC: 0.8 MG/DL
BILIRUB SERPL-MCNC: NORMAL MG/DL
BLOOD URINE, POC: 250
BUN SERPL-MCNC: 13 MG/DL
CALCIUM SERPL-MCNC: 9.5 MG/DL
CHLORIDE SERPL-SCNC: 105 MMOL/L
CO2 SERPL-SCNC: 24 MMOL/L
COLOR, POC UA: YELLOW
CREAT SERPL-MCNC: 1.1 MG/DL
EST. GFR  (AFRICAN AMERICAN): >60 ML/MIN/1.73 M^2
EST. GFR  (NON AFRICAN AMERICAN): >60 ML/MIN/1.73 M^2
GLUCOSE SERPL-MCNC: 79 MG/DL
GLUCOSE UR QL STRIP: NEGATIVE
KETONES UR QL STRIP: ABNORMAL
LEUKOCYTE ESTERASE URINE, POC: ABNORMAL
NITRITE, POC UA: NEGATIVE
PH, POC UA: 5
POTASSIUM SERPL-SCNC: 3.9 MMOL/L
PROT SERPL-MCNC: 7.7 G/DL
PROTEIN, POC: ABNORMAL
SODIUM SERPL-SCNC: 138 MMOL/L
SPECIFIC GRAVITY, POC UA: 1.02
UROBILINOGEN, POC UA: NORMAL
VIT B1 SERPL-MCNC: 44 UG/L (ref 38–122)

## 2018-08-16 PROCEDURE — 86703 HIV-1/HIV-2 1 RESULT ANTBDY: CPT

## 2018-08-16 PROCEDURE — 80053 COMPREHEN METABOLIC PANEL: CPT

## 2018-08-16 PROCEDURE — 87529 HSV DNA AMP PROBE: CPT | Mod: 59

## 2018-08-16 PROCEDURE — 80074 ACUTE HEPATITIS PANEL: CPT

## 2018-08-16 PROCEDURE — 36415 COLL VENOUS BLD VENIPUNCTURE: CPT

## 2018-08-16 PROCEDURE — 86592 SYPHILIS TEST NON-TREP QUAL: CPT

## 2018-08-16 NOTE — DISCHARGE INSTRUCTIONS
It is important that you take the Flomax as prescribed once daily.    You have been prescribed Toradol to take every 6-8 hours for pain.    Please drink plenty of clear fluids, gatorade, powerade, etc. And avoid dark colored drinks.    Schedule a follow-up appointment with a urologist within 1 week.    Return tot the ER for any concerns.

## 2018-08-17 LAB
HAV IGM SERPL QL IA: NEGATIVE
HBV CORE IGM SERPL QL IA: NEGATIVE
HBV SURFACE AG SERPL QL IA: NEGATIVE
HCV AB SERPL QL IA: NEGATIVE
HGB A2 MFR BLD HPLC: 2.2 %
HGB FRACT BLD ELPH-IMP: NORMAL
HGB FRACT BLD ELPH-IMP: NORMAL
HIV 1+2 AB+HIV1 P24 AG SERPL QL IA: NEGATIVE
RPR SER QL: NORMAL

## 2018-08-18 LAB
BACTERIA UR CULT: NORMAL
HSV-1 DNA BY PCR: NEGATIVE
HSV-2 DNA BY PCR: NEGATIVE

## 2018-08-20 LAB
C TRACH DNA SPEC QL NAA+PROBE: NOT DETECTED
N GONORRHOEA DNA SPEC QL NAA+PROBE: NOT DETECTED

## 2018-08-29 ENCOUNTER — OFFICE VISIT (OUTPATIENT)
Dept: FAMILY MEDICINE | Facility: CLINIC | Age: 23
End: 2018-08-29
Payer: COMMERCIAL

## 2018-08-29 VITALS
WEIGHT: 218.5 LBS | RESPIRATION RATE: 16 BRPM | SYSTOLIC BLOOD PRESSURE: 116 MMHG | OXYGEN SATURATION: 99 % | DIASTOLIC BLOOD PRESSURE: 72 MMHG | HEIGHT: 74 IN | TEMPERATURE: 98 F | BODY MASS INDEX: 28.04 KG/M2 | HEART RATE: 84 BPM

## 2018-08-29 DIAGNOSIS — T14.8XXA ABRASION: Primary | ICD-10-CM

## 2018-08-29 PROCEDURE — 99999 PR PBB SHADOW E&M-EST. PATIENT-LVL III: CPT | Mod: PBBFAC,,, | Performed by: PHYSICIAN ASSISTANT

## 2018-08-29 PROCEDURE — 99213 OFFICE O/P EST LOW 20 MIN: CPT | Mod: S$GLB,,, | Performed by: PHYSICIAN ASSISTANT

## 2018-08-29 PROCEDURE — 3008F BODY MASS INDEX DOCD: CPT | Mod: CPTII,S$GLB,, | Performed by: PHYSICIAN ASSISTANT

## 2018-08-29 RX ORDER — MUPIROCIN 20 MG/G
OINTMENT TOPICAL 3 TIMES DAILY
Qty: 30 G | Refills: 0 | Status: SHIPPED | OUTPATIENT
Start: 2018-08-29 | End: 2019-03-08

## 2018-08-29 NOTE — LETTER
August 29, 2018               [facility]  [address]  [address]  [place] [address]  Phone: [phone]  Fax: [phone]   August 29, 2018     Patient: Matthew Schmidt   YOB: 1995   Date of Visit: 8/29/2018       To Whom it May Concern:    Matthew Schmidt was seen in my clinic on 8/29/2018. He may return with no restrictions.    If you have any questions or concerns, please don't hesitate to call.    Sincerely,         BRINDA Zepeda

## 2018-08-29 NOTE — PROGRESS NOTES
Subjective:       Patient ID: Matthew Schmidt is a 22 y.o. male with multiple medical diagnoses as listed in the medical history and problem list that presents for Results  .    Chief Complaint: Results      Rash   This is a new problem. The current episode started in the past 7 days. The problem has been gradually improving (from his shoes form work ) since onset. Location: right foot. The rash is characterized by redness (scabbed ). Past treatments include nothing.      Pt always wanted to review results.   All labs normal.  He states urine back to normal. Never caught stone  Stopped soft drinks. Drinking water.     Review of Systems   Skin: Positive for rash.         PAST MEDICAL HISTORY:  Past Medical History:   Diagnosis Date    Kidney stone        SOCIAL HISTORY:  Social History     Socioeconomic History    Marital status: Single     Spouse name: Not on file    Number of children: Not on file    Years of education: Not on file    Highest education level: Not on file   Social Needs    Financial resource strain: Not on file    Food insecurity - worry: Not on file    Food insecurity - inability: Not on file    Transportation needs - medical: Not on file    Transportation needs - non-medical: Not on file   Occupational History    Not on file   Tobacco Use    Smoking status: Former Smoker     Packs/day: 0.25     Types: Cigarettes    Smokeless tobacco: Never Used    Tobacco comment: 1 week    Substance and Sexual Activity    Alcohol use: Yes    Drug use: No    Sexual activity: Yes     Partners: Female   Other Topics Concern    Not on file   Social History Narrative    Works at [employer]: [employer], [employer]       ALLERGIES AND MEDICATIONS: updated and reviewed.  Review of patient's allergies indicates:  No Known Allergies  Current Outpatient Medications   Medication Sig Dispense Refill    albuterol (PROVENTIL) 2.5 mg /3 mL (0.083 %) nebulizer solution nebulize ONE  "ampule FOUR TIMES DAILY AS NEEDED  0    azelastine (ASTELIN) 137 mcg (0.1 %) nasal spray ONE PUFF EACH NOSTRIL TWICE DAILY  0    desloratadine (CLARINEX) 5 mg tablet Take 1 tablet (5 mg total) by mouth once daily. 30 tablet 11    fluticasone (FLONASE) 50 mcg/actuation nasal spray ONE PUFF EACH NOSTRIL TWICE DAILY  0    ketorolac (TORADOL) 10 mg tablet Take 1 tablet (10 mg total) by mouth every 6 (six) hours as needed for Pain. 15 tablet 0    PROAIR HFA 90 mcg/actuation inhaler       tamsulosin (FLOMAX) 0.4 mg Cap Take 1 capsule (0.4 mg total) by mouth once daily. 30 capsule 0    mupirocin (BACTROBAN) 2 % ointment Apply topically 3 (three) times daily. 30 g 0     No current facility-administered medications for this visit.          Objective:   /72   Pulse 84   Temp 98.1 °F (36.7 °C) (Oral)   Resp 16   Ht 6' 2" (1.88 m)   Wt 99.1 kg (218 lb 7.6 oz)   SpO2 99%   BMI 28.05 kg/m²      Physical Exam   Constitutional: He is oriented to person, place, and time. No distress.   HENT:   Head: Normocephalic and atraumatic.   Musculoskeletal: Normal range of motion.   Neurological: He is alert and oriented to person, place, and time.   Skin: Skin is warm. There is erythema.                Assessment:       1. Abrasion        Plan:       Abrasion  -     mupirocin (BACTROBAN) 2 % ointment; Apply topically 3 (three) times daily.  Dispense: 30 g; Refill: 0            No Follow-up on file.  "

## 2019-03-08 ENCOUNTER — OFFICE VISIT (OUTPATIENT)
Dept: FAMILY MEDICINE | Facility: CLINIC | Age: 24
End: 2019-03-08
Payer: COMMERCIAL

## 2019-03-08 VITALS
DIASTOLIC BLOOD PRESSURE: 88 MMHG | BODY MASS INDEX: 30.16 KG/M2 | HEIGHT: 74 IN | SYSTOLIC BLOOD PRESSURE: 132 MMHG | OXYGEN SATURATION: 98 % | TEMPERATURE: 98 F | WEIGHT: 235 LBS | HEART RATE: 58 BPM

## 2019-03-08 DIAGNOSIS — J01.90 ACUTE RHINOSINUSITIS: Primary | ICD-10-CM

## 2019-03-08 DIAGNOSIS — H66.001 ACUTE SUPPURATIVE OTITIS MEDIA OF RIGHT EAR WITHOUT SPONTANEOUS RUPTURE OF TYMPANIC MEMBRANE, RECURRENCE NOT SPECIFIED: ICD-10-CM

## 2019-03-08 PROCEDURE — 99214 PR OFFICE/OUTPT VISIT, EST, LEVL IV, 30-39 MIN: ICD-10-PCS | Mod: 25,S$GLB,, | Performed by: NURSE PRACTITIONER

## 2019-03-08 PROCEDURE — 99999 PR PBB SHADOW E&M-EST. PATIENT-LVL IV: CPT | Mod: PBBFAC,,, | Performed by: NURSE PRACTITIONER

## 2019-03-08 PROCEDURE — 3008F PR BODY MASS INDEX (BMI) DOCUMENTED: ICD-10-PCS | Mod: CPTII,S$GLB,, | Performed by: NURSE PRACTITIONER

## 2019-03-08 PROCEDURE — 96372 PR INJECTION,THERAP/PROPH/DIAG2ST, IM OR SUBCUT: ICD-10-PCS | Mod: S$GLB,,, | Performed by: NURSE PRACTITIONER

## 2019-03-08 PROCEDURE — 99999 PR PBB SHADOW E&M-EST. PATIENT-LVL IV: ICD-10-PCS | Mod: PBBFAC,,, | Performed by: NURSE PRACTITIONER

## 2019-03-08 PROCEDURE — 3008F BODY MASS INDEX DOCD: CPT | Mod: CPTII,S$GLB,, | Performed by: NURSE PRACTITIONER

## 2019-03-08 PROCEDURE — 99214 OFFICE O/P EST MOD 30 MIN: CPT | Mod: 25,S$GLB,, | Performed by: NURSE PRACTITIONER

## 2019-03-08 PROCEDURE — 96372 THER/PROPH/DIAG INJ SC/IM: CPT | Mod: S$GLB,,, | Performed by: NURSE PRACTITIONER

## 2019-03-08 RX ORDER — FLUTICASONE PROPIONATE 50 MCG
SPRAY, SUSPENSION (ML) NASAL
Qty: 1 BOTTLE | Refills: 5 | OUTPATIENT
Start: 2019-03-08 | End: 2021-01-20

## 2019-03-08 RX ORDER — AMOXICILLIN AND CLAVULANATE POTASSIUM 875; 125 MG/1; MG/1
1 TABLET, FILM COATED ORAL EVERY 12 HOURS
Qty: 20 TABLET | Refills: 0 | Status: SHIPPED | OUTPATIENT
Start: 2019-03-08 | End: 2019-03-18

## 2019-03-08 RX ORDER — PROMETHAZINE HYDROCHLORIDE AND DEXTROMETHORPHAN HYDROBROMIDE 6.25; 15 MG/5ML; MG/5ML
5 SYRUP ORAL NIGHTLY
Qty: 180 ML | Refills: 0 | Status: SHIPPED | OUTPATIENT
Start: 2019-03-08 | End: 2019-03-18

## 2019-03-08 RX ORDER — TRIAMCINOLONE ACETONIDE 40 MG/ML
40 INJECTION, SUSPENSION INTRA-ARTICULAR; INTRAMUSCULAR
Status: COMPLETED | OUTPATIENT
Start: 2019-03-08 | End: 2019-03-08

## 2019-03-08 RX ORDER — DESLORATADINE 5 MG/1
5 TABLET ORAL DAILY
Qty: 30 TABLET | Refills: 11 | Status: SHIPPED | OUTPATIENT
Start: 2019-03-08 | End: 2022-04-01 | Stop reason: ALTCHOICE

## 2019-03-08 RX ADMIN — TRIAMCINOLONE ACETONIDE 40 MG: 40 INJECTION, SUSPENSION INTRA-ARTICULAR; INTRAMUSCULAR at 07:03

## 2019-03-08 NOTE — PROGRESS NOTES
Kenalog injection given. Tolerated well, instructed to wait 15 min for observation. No reaction noted at discharge.

## 2019-03-08 NOTE — PATIENT INSTRUCTIONS
Preventing Sinusitis    Colds, flu, and allergies make it more likely for you to get sinusitis. Do your best to prevent sinusitis by preventing these problems. Do what you can to avoid getting colds and other infections. Stay away from things that cause allergies (allergens). Keep your sinuses as moist as you can.  Tips for air travel  When traveling on an airplane, use saline nasal spray to keep your sinuses moist. Drink plenty of fluids. You may also want to take a decongestant before you get on the plane.   Prevent colds  Do what you can to avoid being exposed to colds and flu. When possible, take more time to rest when you feel something coming on.  · Wash your hands often. This is especially important during cold and flu season. Try not to touch your face.  · As much as possible, stay away from infected people.  · Follow these standbys for staying healthy: Eat balanced meals, exercise regularly, and get plenty of sleep.  Stay away from allergens  First find out what things youre allergic to. Then take steps to stay away from allergens or irritants in the air such as dust, pollution, and pollen.  · Wear a mask when you clean. Or consider hiring a  to help you stay away from dust.  · Sit in the nonsmoking sections of restaurants.  · Don't go outdoors during peak pollution hours such as rush hour.  · Keep an air conditioner on during allergy season. Clean its filter regularly.  · Ask your healthcare provider about a referral to have an allergy evaluation. Or ask for a referral to see an allergy specialist.  Boost moisture  Keeping your sinuses moist makes your mucus thinner. This allows your sinuses to drain better. And this helps prevent infection. Ask your doctor about these suggestions:  · Use a humidifier. Clean it often to remove any mold or mildew.  · Drink several glasses of water a day.  · Stay away from drying beverages such as alcohol and coffee.  · Stay away from all types of smoke,  which dries out sinus linings. This includes tobacco smoke and chemical smoke in workplace settings.  · Use saltwater rinses.  Date Last Reviewed: 10/1/2016  © 5448-6056 [employer]. [address] [place] [address], [place] [address]. All rights reserved. This information is not intended as a substitute for professional medical care. Always follow your healthcare professional's instructions.

## 2019-03-08 NOTE — PROGRESS NOTES
History of Present Illness   Matthew Schmidt is a 23 y.o. man with medical history as listed below who presents today for evaluation of sinusitis symptoms x3 days. He reports nasal congestion, post nasal drip, sinus pressure with headaches, ear fullness with right ear pain, sore throat, and a dry cough. He has had no fevers, chills, or body aches. He has tried Sudafed, Claritin, and Aleve x1 dose with no relief. He admits he has not been taking his Claritin and Flonase daily as directed at his last visit. He has had no known sick contacts. He has no additional complaints and is otherwise healthy on today's visit.    Past Medical History:   Diagnosis Date    Kidney stone        Past Surgical History:   Procedure Laterality Date    ADENOIDECTOMY      TONSILLECTOMY         Social History     Socioeconomic History    Marital status: Single     Spouse name: None    Number of children: None    Years of education: None    Highest education level: None   Social Needs    Financial resource strain: None    Food insecurity - worry: None    Food insecurity - inability: None    Transportation needs - medical: None    Transportation needs - non-medical: None   Occupational History    None   Tobacco Use    Smoking status: Former Smoker     Packs/day: 0.25     Types: Cigarettes    Smokeless tobacco: Never Used    Tobacco comment: 1 week    Substance and Sexual Activity    Alcohol use: Yes    Drug use: No    Sexual activity: Yes     Partners: Female   Other Topics Concern    None   Social History Narrative    Works at [employer]     Video games: PS4, PC-counterstrike       History reviewed. No pertinent family history.    Review of Systems  Review of Systems   Constitutional: Negative for chills, fever and malaise/fatigue.   HENT: Positive for congestion, ear pain, sinus pain and sore throat. Negative for ear discharge.    Eyes: Negative for discharge and redness.   Respiratory: Positive for cough.  "Negative for sputum production, shortness of breath and wheezing.    Cardiovascular: Negative for chest pain.   Gastrointestinal: Negative for nausea and vomiting.   Neurological: Positive for headaches.     A complete review of systems was otherwise negative.    Physical Exam  /88 (BP Location: Left arm, Patient Position: Sitting, BP Method: Medium (Manual))   Pulse (!) 58   Temp 97.9 °F (36.6 °C) (Oral)   Ht 6' 2" (1.88 m)   Wt 106.6 kg (235 lb)   SpO2 98%   BMI 30.17 kg/m²   General appearance: alert, appears stated age, cooperative and no distress  Eyes: negative findings: lids and lashes normal and conjunctivae and sclerae normal  Ears: abnormal external canal right ear - erythematous, abnormal TM right ear - erythematous, dull, bulging and purulent middle ear fluid and abnormal TM left ear - bulging  Nose: clear discharge, moderate congestion, turbinates red, swollen, sinus tenderness bilateral  Throat: lips, mucosa, and tongue normal; teeth and gums normal and moderate oropharyngeal erythema with clear post nasal drainage, no edema or exudates present  Lungs: clear to auscultation bilaterally  Heart: regular rate and rhythm, S1, S2 normal, no murmur, click, rub or gallop  Lymph nodes: Cervical, supraclavicular, and axillary nodes normal.  Neurologic: Grossly normal    Assessment/Plan  Acute rhinosinusitis  One time IM dose of Kenalog provided in clinic  Resume the Clarinex and Flonase daily.  Okay to continue Sudafed for symptoms.  Nighttime cough syrup as needed.  Tylenol or NSAID for headaches and sinus pressure.  Rest and stay well hydrated.  Likely viral, however, the Augmentin for his otitis media would also cover for ABRS.  RTC PRN.  -     fluticasone (FLONASE) 50 mcg/actuation nasal spray; ONE PUFF EACH NOSTRIL TWICE DAILY  Dispense: 1 Bottle; Refill: 5  -     desloratadine (CLARINEX) 5 mg tablet; Take 1 tablet (5 mg total) by mouth once daily.  Dispense: 30 tablet; Refill: 11  -     " triamcinolone acetonide injection 40 mg  -     promethazine-dextromethorphan (PROMETHAZINE-DM) 6.25-15 mg/5 mL Syrp; Take 5 mLs by mouth every evening. for 10 days  Dispense: 180 mL; Refill: 0    Acute suppurative otitis media of right ear without spontaneous rupture of tympanic membrane, recurrence not specified  Take the Augmentin until complete.  Other symptoms management as stated above.  -     amoxicillin-clavulanate 875-125mg (AUGMENTIN) 875-125 mg per tablet; Take 1 tablet by mouth every 12 (twelve) hours. for 10 days  Dispense: 20 tablet; Refill: 0  -     fluticasone (FLONASE) 50 mcg/actuation nasal spray; ONE PUFF EACH NOSTRIL TWICE DAILY  Dispense: 1 Bottle; Refill: 5  -     desloratadine (CLARINEX) 5 mg tablet; Take 1 tablet (5 mg total) by mouth once daily.  Dispense: 30 tablet; Refill: 11  -     triamcinolone acetonide injection 40 mg    Patient has verbalized understanding and is in agreement with plan of care.    Follow-up if symptoms worsen or fail to improve.

## 2020-10-05 ENCOUNTER — PATIENT MESSAGE (OUTPATIENT)
Dept: ADMINISTRATIVE | Facility: HOSPITAL | Age: 25
End: 2020-10-05

## 2021-01-05 ENCOUNTER — PATIENT MESSAGE (OUTPATIENT)
Dept: ADMINISTRATIVE | Facility: HOSPITAL | Age: 26
End: 2021-01-05

## 2021-01-20 ENCOUNTER — HOSPITAL ENCOUNTER (EMERGENCY)
Facility: HOSPITAL | Age: 26
Discharge: HOME | End: 2021-01-20
Attending: EMERGENCY MEDICINE
Payer: COMMERCIAL

## 2021-01-20 VITALS
WEIGHT: 235 LBS | TEMPERATURE: 98 F | DIASTOLIC BLOOD PRESSURE: 68 MMHG | HEIGHT: 73 IN | SYSTOLIC BLOOD PRESSURE: 128 MMHG | OXYGEN SATURATION: 99 % | HEART RATE: 63 BPM | BODY MASS INDEX: 31.14 KG/M2 | RESPIRATION RATE: 18 BRPM
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
## 2021-01-20 LAB
BILIRUB UR QL STRIP: NEGATIVE
CLARITY UR: CLEAR
COLOR UR: YELLOW
GLUCOSE UR QL STRIP: NEGATIVE
HGB UR QL STRIP: NEGATIVE
KETONES UR QL STRIP: NEGATIVE
LEUKOCYTE ESTERASE UR QL STRIP: NEGATIVE
NITRITE UR QL STRIP: NEGATIVE
PH UR STRIP: 5 [PH] (ref 5–8)
PROT UR QL STRIP: NEGATIVE
SP GR UR STRIP: 1.01 (ref 1–1.03)
URN SPEC COLLECT METH UR: NORMAL
UROBILINOGEN UR STRIP-ACNC: NEGATIVE EU/DL

## 2021-01-20 PROCEDURE — 25000003 PHARM REV CODE 250: Performed by: NURSE PRACTITIONER

## 2021-01-20 PROCEDURE — 81003 URINALYSIS AUTO W/O SCOPE: CPT

## 2021-01-20 PROCEDURE — 99284 EMERGENCY DEPT VISIT MOD MDM: CPT | Mod: 25

## 2021-01-20 RX ORDER — NAPROXEN 250 MG/1
250 TABLET ORAL
Status: COMPLETED | OUTPATIENT
Start: 2021-01-20 | End: 2021-01-20

## 2021-01-20 RX ORDER — SULINDAC 150 MG/1
150 TABLET ORAL 2 TIMES DAILY
Qty: 10 TABLET | Refills: 0 | Status: SHIPPED | OUTPATIENT
Start: 2021-01-20 | End: 2021-01-25

## 2021-01-20 RX ORDER — CYCLOBENZAPRINE HCL 10 MG
10 TABLET ORAL 3 TIMES DAILY PRN
Qty: 15 TABLET | Refills: 0 | Status: SHIPPED | OUTPATIENT
Start: 2021-01-20 | End: 2021-01-25

## 2021-01-20 RX ADMIN — NAPROXEN 250 MG: 250 TABLET ORAL at 08:01

## 2021-04-06 ENCOUNTER — PATIENT MESSAGE (OUTPATIENT)
Dept: ADMINISTRATIVE | Facility: HOSPITAL | Age: 26
End: 2021-04-06

## 2023-07-28 ENCOUNTER — OFFICE VISIT (OUTPATIENT)
Dept: FAMILY MEDICINE | Facility: CLINIC | Age: 28
End: 2023-07-28
Payer: COMMERCIAL

## 2023-07-28 VITALS
TEMPERATURE: 98 F | HEART RATE: 78 BPM | RESPIRATION RATE: 16 BRPM | SYSTOLIC BLOOD PRESSURE: 120 MMHG | BODY MASS INDEX: 32.49 KG/M2 | HEIGHT: 73 IN | DIASTOLIC BLOOD PRESSURE: 84 MMHG | WEIGHT: 245.13 LBS | OXYGEN SATURATION: 97 %

## 2023-07-28 DIAGNOSIS — R53.81 MALAISE AND FATIGUE: Primary | ICD-10-CM

## 2023-07-28 DIAGNOSIS — R68.82 LOW LIBIDO: ICD-10-CM

## 2023-07-28 DIAGNOSIS — R53.83 MALAISE AND FATIGUE: Primary | ICD-10-CM

## 2023-07-28 PROCEDURE — 99999 PR PBB SHADOW E&M-EST. PATIENT-LVL IV: CPT | Mod: PBBFAC,,, | Performed by: NURSE PRACTITIONER

## 2023-07-28 PROCEDURE — 1160F PR REVIEW ALL MEDS BY PRESCRIBER/CLIN PHARMACIST DOCUMENTED: ICD-10-PCS | Mod: CPTII,S$GLB,, | Performed by: NURSE PRACTITIONER

## 2023-07-28 PROCEDURE — 1159F PR MEDICATION LIST DOCUMENTED IN MEDICAL RECORD: ICD-10-PCS | Mod: CPTII,S$GLB,, | Performed by: NURSE PRACTITIONER

## 2023-07-28 PROCEDURE — 3074F SYST BP LT 130 MM HG: CPT | Mod: CPTII,S$GLB,, | Performed by: NURSE PRACTITIONER

## 2023-07-28 PROCEDURE — 99999 PR PBB SHADOW E&M-EST. PATIENT-LVL IV: ICD-10-PCS | Mod: PBBFAC,,, | Performed by: NURSE PRACTITIONER

## 2023-07-28 PROCEDURE — 3079F PR MOST RECENT DIASTOLIC BLOOD PRESSURE 80-89 MM HG: ICD-10-PCS | Mod: CPTII,S$GLB,, | Performed by: NURSE PRACTITIONER

## 2023-07-28 PROCEDURE — 99214 OFFICE O/P EST MOD 30 MIN: CPT | Mod: S$GLB,,, | Performed by: NURSE PRACTITIONER

## 2023-07-28 PROCEDURE — 1159F MED LIST DOCD IN RCRD: CPT | Mod: CPTII,S$GLB,, | Performed by: NURSE PRACTITIONER

## 2023-07-28 PROCEDURE — 3079F DIAST BP 80-89 MM HG: CPT | Mod: CPTII,S$GLB,, | Performed by: NURSE PRACTITIONER

## 2023-07-28 PROCEDURE — 3008F PR BODY MASS INDEX (BMI) DOCUMENTED: ICD-10-PCS | Mod: CPTII,S$GLB,, | Performed by: NURSE PRACTITIONER

## 2023-07-28 PROCEDURE — 3008F BODY MASS INDEX DOCD: CPT | Mod: CPTII,S$GLB,, | Performed by: NURSE PRACTITIONER

## 2023-07-28 PROCEDURE — 3074F PR MOST RECENT SYSTOLIC BLOOD PRESSURE < 130 MM HG: ICD-10-PCS | Mod: CPTII,S$GLB,, | Performed by: NURSE PRACTITIONER

## 2023-07-28 PROCEDURE — 1160F RVW MEDS BY RX/DR IN RCRD: CPT | Mod: CPTII,S$GLB,, | Performed by: NURSE PRACTITIONER

## 2023-07-28 PROCEDURE — 99214 PR OFFICE/OUTPT VISIT, EST, LEVL IV, 30-39 MIN: ICD-10-PCS | Mod: S$GLB,,, | Performed by: NURSE PRACTITIONER

## 2023-08-01 NOTE — PROGRESS NOTES
Subjective:      Patient ID: Matthew Schmidt is a 27 y.o. male.  New to me but seen previously in clinic by a fellow provider. Pt presents to clinic with acutely worsening fatigue with low libido now causing significant distress. He is newly  and has been unsuccessful in getting his wife pregnant after a year of trying. Has been unable to exercise due to busy schedule. Is currently in  school and working also. Denies pain, trauma or any other concerns     Fatigue  This is a new problem. The current episode started more than 1 month ago. The problem occurs constantly. The problem has been rapidly worsening. Associated symptoms include fatigue. Pertinent negatives include no abdominal pain, anorexia, arthralgias, change in bowel habit, chest pain, chills, congestion, coughing, diaphoresis, fever, headaches, joint swelling, myalgias, nausea, neck pain, numbness, rash, sore throat, swollen glands, urinary symptoms, vertigo, visual change, vomiting or weakness. Nothing aggravates the symptoms. He has tried relaxation, sleep, rest and lying down for the symptoms. The treatment provided no relief.     Review of Systems   Constitutional:  Positive for fatigue. Negative for activity change, appetite change, chills, diaphoresis, fever and unexpected weight change.   HENT:  Negative for nasal congestion, ear pain, postnasal drip, rhinorrhea, sneezing, sore throat and voice change.    Eyes:  Negative for pain and visual disturbance.   Respiratory:  Negative for cough, chest tightness and shortness of breath.    Cardiovascular:  Negative for chest pain, palpitations and leg swelling.   Gastrointestinal:  Negative for abdominal pain, anorexia, change in bowel habit, constipation, diarrhea, nausea, vomiting and change in bowel habit.   Endocrine: Negative.    Genitourinary:  Negative for bladder incontinence, decreased urine volume, difficulty urinating, discharge, dysuria, enuresis, erectile dysfunction,  "flank pain, frequency, genital sores, hematuria, penile pain, penile swelling, scrotal swelling, testicular pain and urgency.   Musculoskeletal:  Negative for arthralgias, back pain, gait problem, joint swelling, myalgias and neck pain.   Integumentary:  Negative for rash.   Allergic/Immunologic: Negative.    Neurological:  Negative for vertigo, speech difficulty, weakness, numbness and headaches.   Hematological: Negative.    Psychiatric/Behavioral:  Positive for dysphoric mood and sleep disturbance. Negative for agitation, behavioral problems, confusion, decreased concentration, hallucinations, self-injury and suicidal ideas. The patient is not nervous/anxious and is not hyperactive.    All other systems reviewed and are negative.        Objective:     Vitals:    07/28/23 1511   BP: 120/84   Pulse: 78   Resp: 16   Temp: 97.6 °F (36.4 °C)   TempSrc: Oral   SpO2: 97%   Weight: 111.2 kg (245 lb 2.4 oz)   Height: 6' 1" (1.854 m)     Physical Exam  Vitals and nursing note reviewed.   Constitutional:       General: He is not in acute distress.     Appearance: Normal appearance. He is well-developed and well-groomed. He is not ill-appearing.   HENT:      Head: Normocephalic and atraumatic.      Right Ear: External ear normal.      Left Ear: External ear normal.      Nose: Nose normal.      Mouth/Throat:      Lips: Pink.      Mouth: Mucous membranes are moist.   Eyes:      General: Lids are normal. Vision grossly intact. Gaze aligned appropriately.      Conjunctiva/sclera: Conjunctivae normal.      Pupils: Pupils are equal, round, and reactive to light.   Neck:      Trachea: Phonation normal.   Cardiovascular:      Rate and Rhythm: Normal rate and regular rhythm.      Heart sounds: Normal heart sounds.   Pulmonary:      Effort: Pulmonary effort is normal. No accessory muscle usage or respiratory distress.      Breath sounds: Normal breath sounds and air entry. No wheezing or rales.   Abdominal:      General: Abdomen is " flat. Bowel sounds are normal. There is no distension.      Palpations: Abdomen is soft.      Tenderness: There is no abdominal tenderness.   Musculoskeletal:      Cervical back: Neck supple.      Right lower leg: No edema.      Left lower leg: No edema.   Skin:     General: Skin is warm and dry.      Findings: No rash.   Neurological:      General: No focal deficit present.      Mental Status: He is alert and oriented to person, place, and time. Mental status is at baseline.      Sensory: Sensation is intact.      Motor: Motor function is intact.      Coordination: Coordination is intact.      Gait: Gait is intact.   Psychiatric:         Attention and Perception: Attention and perception normal.         Mood and Affect: Mood is depressed. Affect is labile, blunt and tearful.         Speech: Speech normal.         Behavior: Behavior normal. Behavior is cooperative.         Thought Content: Thought content normal. Thought content does not include homicidal or suicidal ideation. Thought content does not include homicidal or suicidal plan.         Cognition and Memory: Cognition and memory normal.         Judgment: Judgment normal.       Assessment and Plan:     1. Malaise and fatigue  Discussed diagnosis with patient.  Reassured that serious underlying cause for the fatigue is very unlikely.  Discussed lifestyle modification as means of resolving problem.  See orders for lab evaluation.  Discussed how depression can be a cause of fatigue.  Follow up in 2 weeks or as needed.  - CBC Auto Differential; Future  - Comprehensive Metabolic Panel; Future  - Hemoglobin A1C; Future  - Lipid Panel; Future  - Vitamin D; Future  - Vitamin B12; Future  - TSH; Future  - Magnesium; Future  - PSA, Screening; Future  - TESTOSTERONE PANEL; Future  - Ambulatory referral/consult to Urology; Future    2. Low libido  Will refer to urology for further management   - PSA, Screening; Future  - TESTOSTERONE PANEL; Future  - Ambulatory  referral/consult to Urology; Future           JOAQUINA Valenzuela, FNP-C  Family/Internal Medicine  [facility]

## 2023-08-12 ENCOUNTER — LAB VISIT (OUTPATIENT)
Dept: LAB | Facility: HOSPITAL | Age: 28
End: 2023-08-12
Attending: NURSE PRACTITIONER
Payer: COMMERCIAL

## 2023-08-12 DIAGNOSIS — R68.82 LOW LIBIDO: ICD-10-CM

## 2023-08-12 DIAGNOSIS — R53.81 MALAISE AND FATIGUE: ICD-10-CM

## 2023-08-12 DIAGNOSIS — R53.83 MALAISE AND FATIGUE: ICD-10-CM

## 2023-08-12 LAB
25(OH)D3+25(OH)D2 SERPL-MCNC: 38 NG/ML (ref 30–96)
ALBUMIN SERPL BCP-MCNC: 4.5 G/DL (ref 3.5–5.2)
ALP SERPL-CCNC: 82 U/L (ref 55–135)
ALT SERPL W/O P-5'-P-CCNC: 25 U/L (ref 10–44)
ANION GAP SERPL CALC-SCNC: 9 MMOL/L (ref 8–16)
AST SERPL-CCNC: 18 U/L (ref 10–40)
BASOPHILS # BLD AUTO: 0.05 K/UL (ref 0–0.2)
BASOPHILS NFR BLD: 0.9 % (ref 0–1.9)
BILIRUB SERPL-MCNC: 0.5 MG/DL (ref 0.1–1)
BUN SERPL-MCNC: 13 MG/DL (ref 6–20)
CALCIUM SERPL-MCNC: 9.9 MG/DL (ref 8.7–10.5)
CHLORIDE SERPL-SCNC: 106 MMOL/L (ref 95–110)
CHOLEST SERPL-MCNC: 216 MG/DL (ref 120–199)
CHOLEST/HDLC SERPL: 5.3 {RATIO} (ref 2–5)
CO2 SERPL-SCNC: 23 MMOL/L (ref 23–29)
COMPLEXED PSA SERPL-MCNC: 0.47 NG/ML (ref 0–4)
CREAT SERPL-MCNC: 1 MG/DL (ref 0.5–1.4)
DIFFERENTIAL METHOD: NORMAL
EOSINOPHIL # BLD AUTO: 0.2 K/UL (ref 0–0.5)
EOSINOPHIL NFR BLD: 3.2 % (ref 0–8)
ERYTHROCYTE [DISTWIDTH] IN BLOOD BY AUTOMATED COUNT: 11.9 % (ref 11.5–14.5)
EST. GFR  (NO RACE VARIABLE): >60 ML/MIN/1.73 M^2
ESTIMATED AVG GLUCOSE: 94 MG/DL (ref 68–131)
GLUCOSE SERPL-MCNC: 91 MG/DL (ref 70–110)
HBA1C MFR BLD: 4.9 % (ref 4–5.6)
HCT VFR BLD AUTO: 41.5 % (ref 40–54)
HDLC SERPL-MCNC: 41 MG/DL (ref 40–75)
HDLC SERPL: 19 % (ref 20–50)
HGB BLD-MCNC: 14.4 G/DL (ref 14–18)
IMM GRANULOCYTES # BLD AUTO: 0.02 K/UL (ref 0–0.04)
IMM GRANULOCYTES NFR BLD AUTO: 0.4 % (ref 0–0.5)
LDLC SERPL CALC-MCNC: 150.4 MG/DL (ref 63–159)
LYMPHOCYTES # BLD AUTO: 1.5 K/UL (ref 1–4.8)
LYMPHOCYTES NFR BLD: 27.2 % (ref 18–48)
MAGNESIUM SERPL-MCNC: 2 MG/DL (ref 1.6–2.6)
MCH RBC QN AUTO: 29.9 PG (ref 27–31)
MCHC RBC AUTO-ENTMCNC: 34.7 G/DL (ref 32–36)
MCV RBC AUTO: 86 FL (ref 82–98)
MONOCYTES # BLD AUTO: 0.7 K/UL (ref 0.3–1)
MONOCYTES NFR BLD: 12.7 % (ref 4–15)
NEUTROPHILS # BLD AUTO: 3.1 K/UL (ref 1.8–7.7)
NEUTROPHILS NFR BLD: 55.6 % (ref 38–73)
NONHDLC SERPL-MCNC: 175 MG/DL
NRBC BLD-RTO: 0 /100 WBC
PLATELET # BLD AUTO: 250 K/UL (ref 150–450)
PMV BLD AUTO: 9.2 FL (ref 9.2–12.9)
POTASSIUM SERPL-SCNC: 4.2 MMOL/L (ref 3.5–5.1)
PROT SERPL-MCNC: 8.3 G/DL (ref 6–8.4)
RBC # BLD AUTO: 4.82 M/UL (ref 4.6–6.2)
SODIUM SERPL-SCNC: 138 MMOL/L (ref 136–145)
TRIGL SERPL-MCNC: 123 MG/DL (ref 30–150)
TSH SERPL DL<=0.005 MIU/L-ACNC: 2 UIU/ML (ref 0.4–4)
VIT B12 SERPL-MCNC: 830 PG/ML (ref 210–950)
WBC # BLD AUTO: 5.59 K/UL (ref 3.9–12.7)

## 2023-08-12 PROCEDURE — 83735 ASSAY OF MAGNESIUM: CPT | Performed by: NURSE PRACTITIONER

## 2023-08-12 PROCEDURE — 82607 VITAMIN B-12: CPT | Performed by: NURSE PRACTITIONER

## 2023-08-12 PROCEDURE — 80061 LIPID PANEL: CPT | Performed by: NURSE PRACTITIONER

## 2023-08-12 PROCEDURE — 84403 ASSAY OF TOTAL TESTOSTERONE: CPT | Performed by: NURSE PRACTITIONER

## 2023-08-12 PROCEDURE — 36415 COLL VENOUS BLD VENIPUNCTURE: CPT | Performed by: NURSE PRACTITIONER

## 2023-08-12 PROCEDURE — 84443 ASSAY THYROID STIM HORMONE: CPT | Performed by: NURSE PRACTITIONER

## 2023-08-12 PROCEDURE — 84153 ASSAY OF PSA TOTAL: CPT | Performed by: NURSE PRACTITIONER

## 2023-08-12 PROCEDURE — 80053 COMPREHEN METABOLIC PANEL: CPT | Performed by: NURSE PRACTITIONER

## 2023-08-12 PROCEDURE — 84270 ASSAY OF SEX HORMONE GLOBUL: CPT | Performed by: NURSE PRACTITIONER

## 2023-08-12 PROCEDURE — 82306 VITAMIN D 25 HYDROXY: CPT | Performed by: NURSE PRACTITIONER

## 2023-08-12 PROCEDURE — 83036 HEMOGLOBIN GLYCOSYLATED A1C: CPT | Performed by: NURSE PRACTITIONER

## 2023-08-12 PROCEDURE — 85025 COMPLETE CBC W/AUTO DIFF WBC: CPT | Performed by: NURSE PRACTITIONER

## 2023-08-12 NOTE — PROGRESS NOTES
"RAPID RESPONSE NURSE NOTE        Admit Date: (Not on file)  LOS: 0  Code Status: [unfilled]   Date of Consult: 2023  : 1995  Age: 27 y.o.  Weight:   Wt Readings from Last 1 Encounters:   23 111.2 kg (245 lb 2.4 oz)     Sex: male  Race: White   Bed: Room/bed info not found:   MRN: [id number]  Time Rapid Response Team page Received: 1048  Time Rapid Response Team at Bedside: 1049  Time Rapid Response Team left Bedside: 1101  Was the patient discharged from an ICU this admission? No   Was the patient discharged from a PACU within last 24 hours? No   Did the patient receive conscious sedation/general anesthesia in last 24 hours? No   Was the patient in the ED within the past 24 hours? No   Was the patient on NIPPV within the past 24 hours? No   Did this progress into an ARC or CPA:  no  Attending Physician: Genna Dietrich NP  Primary Service: Family Medicine     SITUATION     Notified by overhead page.  Reason for alert: diaphoretic after lab  Called to evaluate the patient for  diaphoretic    Why is the patient in the hospital?: [unfilled]    Patient has a past medical history of Allergy and Kidney stone.    Last Vitals:  [unfilled]    24 Hours Vitals Range:  [unfilled]    Labs:  Recent Labs     23  1041   WBC 5.59   HGB 14.4   HCT 41.5          No results for input(s): "NA", "K", "CL", "CO2", "BUN", "CREATININE", "GLU", "PHOS", "MG" in the last 72 hours.    Invalid input(s): "CMP", "TBIL"     No results for input(s): "PH", "PCO2", "PO2", "HCO3", "POCSATURATED", "BE" in the last 72 hours.     ASSESSMENT/INTERVENTIONS        RECOMMENDATIONS    We recommend:to ER    Discussed plan of care with  Dr. Shields    PROVIDER ESCALATION    Orders received and case discussed with Dr. Shields .    Disposition: Tx to ER bed [facility]    FOLLOW UP  Call the Rapid Response Nurse[facility]a  at x [phone] for additional questions or concerns.             "

## 2023-08-19 LAB
ALBUMIN SERPL-MCNC: 4.6 G/DL (ref 3.6–5.1)
SHBG SERPL-SCNC: 25 NMOL/L (ref 10–50)
TESTOST FREE SERPL-MCNC: 103.3 PG/ML (ref 46–224)
TESTOST SERPL-MCNC: 597 NG/DL (ref 250–1100)
TESTOSTERONE.FREE+WB SERPL-MCNC: 216.8 NG/DL (ref 110–575)